# Patient Record
Sex: MALE | Race: WHITE | NOT HISPANIC OR LATINO | URBAN - METROPOLITAN AREA
[De-identification: names, ages, dates, MRNs, and addresses within clinical notes are randomized per-mention and may not be internally consistent; named-entity substitution may affect disease eponyms.]

---

## 2018-07-16 ENCOUNTER — EMERGENCY (EMERGENCY)
Facility: HOSPITAL | Age: 63
LOS: 1 days | Discharge: HOME | End: 2018-07-16
Attending: EMERGENCY MEDICINE
Payer: COMMERCIAL

## 2018-07-16 VITALS
HEART RATE: 64 BPM | DIASTOLIC BLOOD PRESSURE: 105 MMHG | RESPIRATION RATE: 18 BRPM | SYSTOLIC BLOOD PRESSURE: 162 MMHG | OXYGEN SATURATION: 97 % | TEMPERATURE: 98 F | WEIGHT: 240.08 LBS

## 2018-07-16 PROCEDURE — 99284 EMERGENCY DEPT VISIT MOD MDM: CPT | Mod: 25

## 2018-07-16 NOTE — ED PROVIDER NOTE - SKIN, MLM
pitting edema of bilateral lower legs with early cellulitis of the left leg, blisters noted likely pressure related symmetrical pitting edema of bilateral lower legs with early cellulitis of the left leg, blisters to left and venous stasis changes to the lower extremity

## 2018-07-16 NOTE — ED PROVIDER NOTE - ATTENDING CONTRIBUTION TO CARE
Patient with chief complaint of lower extremity swelling. Left leg has now started to have some open skin changes, warmth and redness. Patient states the swelling has been gradual. + mild pain to left medial shin and is concerned for cellulitis. no shortness of breath, no recent travel, no stationary periods, + smoker and educated to stop smoking.  GEN: NAD, awake, eyes open spontaneously  HEENT: NCAT, MMM, Trachea midline, normal conjunctiva, perrl  CHEST/LUNGS: Non-tachypneic, CTAB, bilateral breath sounds  CARDIAC: Non-tachycardic, normal perfusion  ABDOMEN: Soft, NTND, No rebound/guarding  MSK: bilateral lower extremity edema and left lower extremity with venous stasis changes, slight erythema, warmth and tenderness to palpation over skin overlying left medial shin  SKIN: No rashes, no petechiae, no vesicles  NEURO: CN grossly intact, normal coordination, no focal motor or sensory deficits  PSYCH: Alert, appropriate, cooperative, with capacity and insight   patient with skin changes consistent with cellulitis early on secondary to venous stasis, shortness of breath likely secondary to deconditioning and ekg within normal limits along with proBNP will have patient  follow up with primary medical doctor and cardiology  no evidence for dvt on history or clinical exam as swelling is symmetric with open wound to skin on left shin  will get iv, labs, proBNP, cbc, cmp, and ekg  will give antibiotics and discharge on antibiotics   No immediate life threatening issues present on history or clinical exam. Patient is a safe disposition home, has capacity and insight into their condition, is ambulatory in the Emergency Department with no further questions and will follow up with their doctor(s) this week. Patient  understands anticipatory guidance and was given strict return and follow up precautions. The patient has been informed of all concerning signs and symptoms to return to Emergency Department, the necessity to follow up with the PMD/Clinic/follow up provided within 2-3 days was explained, and the patient reports understanding of above with capacity and insight.

## 2018-07-16 NOTE — ED PROVIDER NOTE - PROGRESS NOTE DETAILS
pt labs all are normal, will start on empiric abx for cellulitis and emphasize outpatient follow up  Mi Palomo, PGY-2 EM

## 2018-07-16 NOTE — ED PROVIDER NOTE - MEDICAL DECISION MAKING DETAILS
Early cellulitis of the left lower extremity, bilateral pitting edema possibly 2/2 to underlying kidney/cardiac dysfunction, will check basic labs/bnp, oral abx, follow up care emphasized with cardiology or primary care   Mi Palomo, PGY-2 EM

## 2018-07-16 NOTE — ED PROVIDER NOTE - OBJECTIVE STATEMENT
64 yo M pmh cellulitis of the lower extremities 1 year ago p/w L>R lower extremity swelling, redness, and pain. Pt reports he first noticed the swelling this week. Feels similar to cellulitis he's had in the past. Reports sob walking short distances, also believes he has retained a lot of weight recently. Has not seen a pmd in many years. Denies any chest pain, fever/chills, n/v/d, or any other complaints. 64 yo M pmh cellulitis of the lower extremities 1 year ago p/w bilateral lower extremity swelling, and with redness, and pain to the left lower extremity. Pt reports he first noticed the left leg swelling this week. Feels similar to cellulitis he's had in the past. Reports sob walking short distances, also believes he has retained a lot of weight recently. Has not seen a pmd in many years. Denies any chest pain, fever/chills, n/v/d, or any other complaints.

## 2018-07-16 NOTE — ED PROVIDER NOTE - CARE PLAN
Principal Discharge DX:	Cellulitis  Assessment and plan of treatment:	1) Please follow-up with your primary care doctor in the next 1-2 days.  Please call tomorrow for an appointment.  If you cannot follow-up with your primary care doctor please return to the ED for any urgent issues.  2) You were given a copy of the tests performed today.  Please bring the results with you and review them with your primary care doctor.  3) If you have any worsening of symptoms or any other concerns please return to the ED immediately. This includes chest pain, shortness of breath, fever/chills, increased pain, or any other concerns.  4) Please continue taking your home medications as directed.    **New medications: Cefpodoxime, 200 mg twice a day for 7 days

## 2018-07-16 NOTE — ED PROVIDER NOTE - PLAN OF CARE
1) Please follow-up with your primary care doctor in the next 1-2 days.  Please call tomorrow for an appointment.  If you cannot follow-up with your primary care doctor please return to the ED for any urgent issues.  2) You were given a copy of the tests performed today.  Please bring the results with you and review them with your primary care doctor.  3) If you have any worsening of symptoms or any other concerns please return to the ED immediately. This includes chest pain, shortness of breath, fever/chills, increased pain, or any other concerns.  4) Please continue taking your home medications as directed.    **New medications: Cefpodoxime, 200 mg twice a day for 7 days

## 2018-07-17 VITALS
RESPIRATION RATE: 16 BRPM | HEART RATE: 80 BPM | SYSTOLIC BLOOD PRESSURE: 170 MMHG | OXYGEN SATURATION: 99 % | DIASTOLIC BLOOD PRESSURE: 100 MMHG

## 2018-07-17 LAB
ALBUMIN SERPL ELPH-MCNC: 4 G/DL — SIGNIFICANT CHANGE UP (ref 3.3–5)
ALP SERPL-CCNC: 104 U/L — SIGNIFICANT CHANGE UP (ref 40–120)
ALT FLD-CCNC: 37 U/L — SIGNIFICANT CHANGE UP (ref 10–45)
ANION GAP SERPL CALC-SCNC: 12 MMOL/L — SIGNIFICANT CHANGE UP (ref 5–17)
AST SERPL-CCNC: 29 U/L — SIGNIFICANT CHANGE UP (ref 10–40)
BASOPHILS # BLD AUTO: 0 K/UL — SIGNIFICANT CHANGE UP (ref 0–0.2)
BASOPHILS NFR BLD AUTO: 0.3 % — SIGNIFICANT CHANGE UP (ref 0–2)
BILIRUB SERPL-MCNC: 0.5 MG/DL — SIGNIFICANT CHANGE UP (ref 0.2–1.2)
BUN SERPL-MCNC: 21 MG/DL — SIGNIFICANT CHANGE UP (ref 7–23)
CALCIUM SERPL-MCNC: 9.2 MG/DL — SIGNIFICANT CHANGE UP (ref 8.4–10.5)
CHLORIDE SERPL-SCNC: 104 MMOL/L — SIGNIFICANT CHANGE UP (ref 96–108)
CO2 SERPL-SCNC: 26 MMOL/L — SIGNIFICANT CHANGE UP (ref 22–31)
CREAT SERPL-MCNC: 0.87 MG/DL — SIGNIFICANT CHANGE UP (ref 0.5–1.3)
EOSINOPHIL # BLD AUTO: 0.2 K/UL — SIGNIFICANT CHANGE UP (ref 0–0.5)
EOSINOPHIL NFR BLD AUTO: 3.1 % — SIGNIFICANT CHANGE UP (ref 0–6)
GAS PNL BLDV: SIGNIFICANT CHANGE UP
GLUCOSE SERPL-MCNC: 105 MG/DL — HIGH (ref 70–99)
HCT VFR BLD CALC: 41.7 % — SIGNIFICANT CHANGE UP (ref 39–50)
HGB BLD-MCNC: 14.3 G/DL — SIGNIFICANT CHANGE UP (ref 13–17)
LYMPHOCYTES # BLD AUTO: 2.6 K/UL — SIGNIFICANT CHANGE UP (ref 1–3.3)
LYMPHOCYTES # BLD AUTO: 35 % — SIGNIFICANT CHANGE UP (ref 13–44)
MCHC RBC-ENTMCNC: 30.9 PG — SIGNIFICANT CHANGE UP (ref 27–34)
MCHC RBC-ENTMCNC: 34.4 GM/DL — SIGNIFICANT CHANGE UP (ref 32–36)
MCV RBC AUTO: 89.9 FL — SIGNIFICANT CHANGE UP (ref 80–100)
MONOCYTES # BLD AUTO: 0.8 K/UL — SIGNIFICANT CHANGE UP (ref 0–0.9)
MONOCYTES NFR BLD AUTO: 11.5 % — SIGNIFICANT CHANGE UP (ref 2–14)
NEUTROPHILS # BLD AUTO: 3.7 K/UL — SIGNIFICANT CHANGE UP (ref 1.8–7.4)
NEUTROPHILS NFR BLD AUTO: 50 % — SIGNIFICANT CHANGE UP (ref 43–77)
NT-PROBNP SERPL-SCNC: 23 PG/ML — SIGNIFICANT CHANGE UP (ref 0–300)
PLATELET # BLD AUTO: 175 K/UL — SIGNIFICANT CHANGE UP (ref 150–400)
POTASSIUM SERPL-MCNC: 4.1 MMOL/L — SIGNIFICANT CHANGE UP (ref 3.5–5.3)
POTASSIUM SERPL-SCNC: 4.1 MMOL/L — SIGNIFICANT CHANGE UP (ref 3.5–5.3)
PROT SERPL-MCNC: 7.2 G/DL — SIGNIFICANT CHANGE UP (ref 6–8.3)
RBC # BLD: 4.64 M/UL — SIGNIFICANT CHANGE UP (ref 4.2–5.8)
RBC # FLD: 12.5 % — SIGNIFICANT CHANGE UP (ref 10.3–14.5)
SODIUM SERPL-SCNC: 142 MMOL/L — SIGNIFICANT CHANGE UP (ref 135–145)
WBC # BLD: 7.4 K/UL — SIGNIFICANT CHANGE UP (ref 3.8–10.5)
WBC # FLD AUTO: 7.4 K/UL — SIGNIFICANT CHANGE UP (ref 3.8–10.5)

## 2018-07-17 PROCEDURE — 85027 COMPLETE CBC AUTOMATED: CPT

## 2018-07-17 PROCEDURE — 82803 BLOOD GASES ANY COMBINATION: CPT

## 2018-07-17 PROCEDURE — 84295 ASSAY OF SERUM SODIUM: CPT

## 2018-07-17 PROCEDURE — 85014 HEMATOCRIT: CPT

## 2018-07-17 PROCEDURE — 99283 EMERGENCY DEPT VISIT LOW MDM: CPT

## 2018-07-17 PROCEDURE — 82947 ASSAY GLUCOSE BLOOD QUANT: CPT

## 2018-07-17 PROCEDURE — 82330 ASSAY OF CALCIUM: CPT

## 2018-07-17 PROCEDURE — 83605 ASSAY OF LACTIC ACID: CPT

## 2018-07-17 PROCEDURE — 82435 ASSAY OF BLOOD CHLORIDE: CPT

## 2018-07-17 PROCEDURE — 80053 COMPREHEN METABOLIC PANEL: CPT

## 2018-07-17 PROCEDURE — 84132 ASSAY OF SERUM POTASSIUM: CPT

## 2018-07-17 PROCEDURE — 83880 ASSAY OF NATRIURETIC PEPTIDE: CPT

## 2018-07-17 RX ORDER — CEFPODOXIME PROXETIL 100 MG
200 TABLET ORAL EVERY 12 HOURS
Qty: 0 | Refills: 0 | Status: DISCONTINUED | OUTPATIENT
Start: 2018-07-17 | End: 2018-07-20

## 2018-07-17 RX ORDER — CEFPODOXIME PROXETIL 100 MG
400 TABLET ORAL EVERY 12 HOURS
Qty: 0 | Refills: 0 | Status: DISCONTINUED | OUTPATIENT
Start: 2018-07-17 | End: 2018-07-17

## 2018-07-17 RX ORDER — CEFPODOXIME PROXETIL 100 MG
1 TABLET ORAL
Qty: 14 | Refills: 0 | OUTPATIENT
Start: 2018-07-17 | End: 2018-07-23

## 2018-07-17 RX ADMIN — Medication 200 MILLIGRAM(S): at 01:00

## 2018-07-17 NOTE — ED ADULT NURSE NOTE - OBJECTIVE STATEMENT
pt co lle redness pain swelling worsening with blisters x1wk, aox3 maex4 co schafefr while walking denies cp

## 2018-08-06 ENCOUNTER — INPATIENT (INPATIENT)
Facility: HOSPITAL | Age: 63
LOS: 0 days | Discharge: HOME | DRG: 301 | End: 2018-08-07
Attending: HOSPITALIST | Admitting: INTERNAL MEDICINE
Payer: COMMERCIAL

## 2018-08-06 VITALS
HEART RATE: 73 BPM | OXYGEN SATURATION: 96 % | DIASTOLIC BLOOD PRESSURE: 97 MMHG | WEIGHT: 229.94 LBS | SYSTOLIC BLOOD PRESSURE: 172 MMHG | RESPIRATION RATE: 18 BRPM | TEMPERATURE: 98 F

## 2018-08-06 PROBLEM — Z00.00 ENCOUNTER FOR PREVENTIVE HEALTH EXAMINATION: Status: ACTIVE | Noted: 2018-08-06

## 2018-08-06 LAB
ALBUMIN SERPL ELPH-MCNC: 4.2 G/DL — SIGNIFICANT CHANGE UP (ref 3.3–5)
ALP SERPL-CCNC: 109 U/L — SIGNIFICANT CHANGE UP (ref 40–120)
ALT FLD-CCNC: 36 U/L — SIGNIFICANT CHANGE UP (ref 10–45)
ANION GAP SERPL CALC-SCNC: 11 MMOL/L — SIGNIFICANT CHANGE UP (ref 5–17)
APPEARANCE UR: CLEAR — SIGNIFICANT CHANGE UP
APTT BLD: 30.2 SEC — SIGNIFICANT CHANGE UP (ref 27.5–37.4)
AST SERPL-CCNC: 33 U/L — SIGNIFICANT CHANGE UP (ref 10–40)
BACTERIA # UR AUTO: ABNORMAL /HPF
BASOPHILS # BLD AUTO: 0 K/UL — SIGNIFICANT CHANGE UP (ref 0–0.2)
BASOPHILS NFR BLD AUTO: 0.5 % — SIGNIFICANT CHANGE UP (ref 0–2)
BILIRUB SERPL-MCNC: 0.4 MG/DL — SIGNIFICANT CHANGE UP (ref 0.2–1.2)
BILIRUB UR-MCNC: NEGATIVE — SIGNIFICANT CHANGE UP
BUN SERPL-MCNC: 16 MG/DL — SIGNIFICANT CHANGE UP (ref 7–23)
CALCIUM SERPL-MCNC: 9.3 MG/DL — SIGNIFICANT CHANGE UP (ref 8.4–10.5)
CHLORIDE SERPL-SCNC: 104 MMOL/L — SIGNIFICANT CHANGE UP (ref 96–108)
CO2 SERPL-SCNC: 24 MMOL/L — SIGNIFICANT CHANGE UP (ref 22–31)
COLOR SPEC: YELLOW — SIGNIFICANT CHANGE UP
COMMENT - URINE: SIGNIFICANT CHANGE UP
CREAT SERPL-MCNC: 0.85 MG/DL — SIGNIFICANT CHANGE UP (ref 0.5–1.3)
DIFF PNL FLD: NEGATIVE — SIGNIFICANT CHANGE UP
EOSINOPHIL # BLD AUTO: 0.2 K/UL — SIGNIFICANT CHANGE UP (ref 0–0.5)
EOSINOPHIL NFR BLD AUTO: 3.2 % — SIGNIFICANT CHANGE UP (ref 0–6)
GAS PNL BLDV: SIGNIFICANT CHANGE UP
GLUCOSE SERPL-MCNC: 108 MG/DL — HIGH (ref 70–99)
GLUCOSE UR QL: NEGATIVE — SIGNIFICANT CHANGE UP
HCT VFR BLD CALC: 39.5 % — SIGNIFICANT CHANGE UP (ref 39–50)
HGB BLD-MCNC: 13.4 G/DL — SIGNIFICANT CHANGE UP (ref 13–17)
INR BLD: 1.03 RATIO — SIGNIFICANT CHANGE UP (ref 0.88–1.16)
KETONES UR-MCNC: NEGATIVE — SIGNIFICANT CHANGE UP
LEUKOCYTE ESTERASE UR-ACNC: NEGATIVE — SIGNIFICANT CHANGE UP
LYMPHOCYTES # BLD AUTO: 2.5 K/UL — SIGNIFICANT CHANGE UP (ref 1–3.3)
LYMPHOCYTES # BLD AUTO: 36.5 % — SIGNIFICANT CHANGE UP (ref 13–44)
MCHC RBC-ENTMCNC: 29.9 PG — SIGNIFICANT CHANGE UP (ref 27–34)
MCHC RBC-ENTMCNC: 34 GM/DL — SIGNIFICANT CHANGE UP (ref 32–36)
MCV RBC AUTO: 87.8 FL — SIGNIFICANT CHANGE UP (ref 80–100)
MONOCYTES # BLD AUTO: 0.7 K/UL — SIGNIFICANT CHANGE UP (ref 0–0.9)
MONOCYTES NFR BLD AUTO: 10 % — SIGNIFICANT CHANGE UP (ref 2–14)
NEUTROPHILS # BLD AUTO: 3.4 K/UL — SIGNIFICANT CHANGE UP (ref 1.8–7.4)
NEUTROPHILS NFR BLD AUTO: 49.7 % — SIGNIFICANT CHANGE UP (ref 43–77)
NITRITE UR-MCNC: NEGATIVE — SIGNIFICANT CHANGE UP
NT-PROBNP SERPL-SCNC: 13 PG/ML — SIGNIFICANT CHANGE UP (ref 0–300)
PH UR: 6 — SIGNIFICANT CHANGE UP (ref 5–8)
PLATELET # BLD AUTO: 212 K/UL — SIGNIFICANT CHANGE UP (ref 150–400)
POTASSIUM SERPL-MCNC: 4.2 MMOL/L — SIGNIFICANT CHANGE UP (ref 3.5–5.3)
POTASSIUM SERPL-SCNC: 4.2 MMOL/L — SIGNIFICANT CHANGE UP (ref 3.5–5.3)
PROT SERPL-MCNC: 7.5 G/DL — SIGNIFICANT CHANGE UP (ref 6–8.3)
PROT UR-MCNC: NEGATIVE — SIGNIFICANT CHANGE UP
PROTHROM AB SERPL-ACNC: 11.2 SEC — SIGNIFICANT CHANGE UP (ref 9.8–12.7)
RBC # BLD: 4.5 M/UL — SIGNIFICANT CHANGE UP (ref 4.2–5.8)
RBC # FLD: 12.4 % — SIGNIFICANT CHANGE UP (ref 10.3–14.5)
RBC CASTS # UR COMP ASSIST: SIGNIFICANT CHANGE UP /HPF (ref 0–2)
SODIUM SERPL-SCNC: 139 MMOL/L — SIGNIFICANT CHANGE UP (ref 135–145)
SP GR SPEC: 1.03 — HIGH (ref 1.01–1.02)
TROPONIN T, HIGH SENSITIVITY RESULT: 10 NG/L — SIGNIFICANT CHANGE UP (ref 0–51)
UROBILINOGEN FLD QL: 1 MG/DL
WBC # BLD: 6.8 K/UL — SIGNIFICANT CHANGE UP (ref 3.8–10.5)
WBC # FLD AUTO: 6.8 K/UL — SIGNIFICANT CHANGE UP (ref 3.8–10.5)
WBC UR QL: SIGNIFICANT CHANGE UP /HPF (ref 0–5)

## 2018-08-06 PROCEDURE — 99285 EMERGENCY DEPT VISIT HI MDM: CPT | Mod: 25

## 2018-08-06 PROCEDURE — 93010 ELECTROCARDIOGRAM REPORT: CPT

## 2018-08-06 PROCEDURE — 71046 X-RAY EXAM CHEST 2 VIEWS: CPT | Mod: 26

## 2018-08-06 NOTE — ED PROVIDER NOTE - MEDICAL DECISION MAKING DETAILS
63 M no known pmhx no PMD, hasn't seen a doctor in very long time, p/w worsening bilateral lower extremity edema and erythema with new weeping of LLE secondary to increasing edema, concern for poorly treated cellulitis as he took cefpodoxime for 7 days and no improvement; notable RIZO and orthopnea. PE remarkable for aforementioned. M/p fluid overload and mild chf given story, will obtain labs pro-bnp and cardiac enzymes, ekg, cxr and reassess TBA.

## 2018-08-06 NOTE — ED PROVIDER NOTE - NS ED ROS FT
Constitutional: No fever or chills  Eyes: No visual changes, eye pain or redness  HEENT: No throat pain, ear pain, nasal pain. No nose bleeding.  CV: No chest pain +LE edema  Resp: +shortness of breath, worsening. no cough  GI: No abd pain. No nausea or vomiting. No diarrhea. No constipation.   : No dysuria, hematuria.   MSK: No musculoskeletal pain  Skin: No rash  Neuro: No headache. No numbness or tingling. No weakness.

## 2018-08-06 NOTE — ED PROVIDER NOTE - ATTENDING CONTRIBUTION TO CARE
attending Ligia: 63yM no PMH does not follow with a doctor presents with worsening b/l LE edema and redness x weeks. Seen in ED several weeks ago and completed course abx without improvement. Also with orthopnea, RIZO and abdominal bloating. Denies any chest pain. Denies fevers or chills.  Concern for edema 2/2 CHF vs DVT vs chronic venous stasis. Will obtain ekg, place on tele, labs including pro-BNP, LE duplex eval for DVT, cxr and reassess

## 2018-08-06 NOTE — ED PROVIDER NOTE - PHYSICAL EXAMINATION
GEN: Well Appearing, Nontoxic, NAD  HEENT: NC/AT, Symm Facies. PERRL, EOMI, MMM, posterior pharynx clear  CV: No JVD/Bruits or stridor;  +S1S2, RRR w/o m/g/r  RESP: CTAB w/o w/r/r  ABD: Soft, nt/nd, +BS. No guarding/rebound. No RUQ tender, no CVAT  EXT/MSK: 2+ pitting edema with weeping noted to lateral L lower leg. +erythema to bilateral lower extremities below knee without warmth or tenderness. No posterior calf tenderness. WWP. Palpable pulses. FROMx4  SKIN: No erythema, lesions or rash  Neuro: Grossly intact, AOX3 with normal speech, CN II-XII intact; Sensation intact, motor 5/5 throughout. Gait normal

## 2018-08-07 ENCOUNTER — TRANSCRIPTION ENCOUNTER (OUTPATIENT)
Age: 63
End: 2018-08-07

## 2018-08-07 VITALS
OXYGEN SATURATION: 98 % | SYSTOLIC BLOOD PRESSURE: 154 MMHG | HEART RATE: 62 BPM | DIASTOLIC BLOOD PRESSURE: 96 MMHG | RESPIRATION RATE: 19 BRPM | TEMPERATURE: 98 F

## 2018-08-07 DIAGNOSIS — R60.9 EDEMA, UNSPECIFIED: ICD-10-CM

## 2018-08-07 DIAGNOSIS — Z29.9 ENCOUNTER FOR PROPHYLACTIC MEASURES, UNSPECIFIED: ICD-10-CM

## 2018-08-07 DIAGNOSIS — R60.0 LOCALIZED EDEMA: ICD-10-CM

## 2018-08-07 DIAGNOSIS — I10 ESSENTIAL (PRIMARY) HYPERTENSION: ICD-10-CM

## 2018-08-07 LAB
HBA1C BLD-MCNC: 6.1 % — HIGH (ref 4–5.6)
TROPONIN T, HIGH SENSITIVITY RESULT: 10 NG/L — SIGNIFICANT CHANGE UP (ref 0–51)
TSH SERPL-MCNC: 3.05 UIU/ML — SIGNIFICANT CHANGE UP (ref 0.27–4.2)

## 2018-08-07 PROCEDURE — 93970 EXTREMITY STUDY: CPT | Mod: 26

## 2018-08-07 PROCEDURE — 84295 ASSAY OF SERUM SODIUM: CPT

## 2018-08-07 PROCEDURE — 12345: CPT

## 2018-08-07 PROCEDURE — 83880 ASSAY OF NATRIURETIC PEPTIDE: CPT

## 2018-08-07 PROCEDURE — 81001 URINALYSIS AUTO W/SCOPE: CPT

## 2018-08-07 PROCEDURE — 96374 THER/PROPH/DIAG INJ IV PUSH: CPT

## 2018-08-07 PROCEDURE — 83036 HEMOGLOBIN GLYCOSYLATED A1C: CPT

## 2018-08-07 PROCEDURE — 80053 COMPREHEN METABOLIC PANEL: CPT

## 2018-08-07 PROCEDURE — 82803 BLOOD GASES ANY COMBINATION: CPT

## 2018-08-07 PROCEDURE — 85610 PROTHROMBIN TIME: CPT

## 2018-08-07 PROCEDURE — 99223 1ST HOSP IP/OBS HIGH 75: CPT | Mod: GC

## 2018-08-07 PROCEDURE — 82330 ASSAY OF CALCIUM: CPT

## 2018-08-07 PROCEDURE — 93970 EXTREMITY STUDY: CPT

## 2018-08-07 PROCEDURE — G0378: CPT

## 2018-08-07 PROCEDURE — 84443 ASSAY THYROID STIM HORMONE: CPT

## 2018-08-07 PROCEDURE — 93005 ELECTROCARDIOGRAM TRACING: CPT

## 2018-08-07 PROCEDURE — 99285 EMERGENCY DEPT VISIT HI MDM: CPT | Mod: 25

## 2018-08-07 PROCEDURE — 82435 ASSAY OF BLOOD CHLORIDE: CPT

## 2018-08-07 PROCEDURE — 85027 COMPLETE CBC AUTOMATED: CPT

## 2018-08-07 PROCEDURE — 85730 THROMBOPLASTIN TIME PARTIAL: CPT

## 2018-08-07 PROCEDURE — 83605 ASSAY OF LACTIC ACID: CPT

## 2018-08-07 PROCEDURE — 85014 HEMATOCRIT: CPT

## 2018-08-07 PROCEDURE — 71046 X-RAY EXAM CHEST 2 VIEWS: CPT

## 2018-08-07 PROCEDURE — 82947 ASSAY GLUCOSE BLOOD QUANT: CPT

## 2018-08-07 PROCEDURE — 84132 ASSAY OF SERUM POTASSIUM: CPT

## 2018-08-07 PROCEDURE — 84484 ASSAY OF TROPONIN QUANT: CPT

## 2018-08-07 RX ORDER — HYDROCHLOROTHIAZIDE 25 MG
12.5 TABLET ORAL DAILY
Qty: 0 | Refills: 0 | Status: DISCONTINUED | OUTPATIENT
Start: 2018-08-07 | End: 2018-08-07

## 2018-08-07 RX ORDER — ASPIRIN/CALCIUM CARB/MAGNESIUM 324 MG
81 TABLET ORAL DAILY
Qty: 0 | Refills: 0 | Status: DISCONTINUED | OUTPATIENT
Start: 2018-08-07 | End: 2018-08-07

## 2018-08-07 RX ORDER — ENOXAPARIN SODIUM 100 MG/ML
40 INJECTION SUBCUTANEOUS DAILY
Qty: 0 | Refills: 0 | Status: DISCONTINUED | OUTPATIENT
Start: 2018-08-07 | End: 2018-08-07

## 2018-08-07 RX ORDER — ASPIRIN/CALCIUM CARB/MAGNESIUM 324 MG
1 TABLET ORAL
Qty: 0 | Refills: 0 | COMMUNITY

## 2018-08-07 RX ORDER — ACETAMINOPHEN 500 MG
650 TABLET ORAL EVERY 6 HOURS
Qty: 0 | Refills: 0 | Status: DISCONTINUED | OUTPATIENT
Start: 2018-08-07 | End: 2018-08-07

## 2018-08-07 RX ORDER — FUROSEMIDE 40 MG
40 TABLET ORAL ONCE
Qty: 0 | Refills: 0 | Status: COMPLETED | OUTPATIENT
Start: 2018-08-07 | End: 2018-08-07

## 2018-08-07 RX ORDER — HYDROCHLOROTHIAZIDE 25 MG
25 TABLET ORAL DAILY
Qty: 0 | Refills: 0 | Status: DISCONTINUED | OUTPATIENT
Start: 2018-08-07 | End: 2018-08-07

## 2018-08-07 RX ADMIN — Medication 12.5 MILLIGRAM(S): at 06:00

## 2018-08-07 RX ADMIN — Medication 650 MILLIGRAM(S): at 04:34

## 2018-08-07 RX ADMIN — Medication 40 MILLIGRAM(S): at 00:48

## 2018-08-07 RX ADMIN — Medication 650 MILLIGRAM(S): at 03:55

## 2018-08-07 RX ADMIN — ENOXAPARIN SODIUM 40 MILLIGRAM(S): 100 INJECTION SUBCUTANEOUS at 12:57

## 2018-08-07 RX ADMIN — Medication 81 MILLIGRAM(S): at 06:00

## 2018-08-07 NOTE — DISCHARGE NOTE ADULT - MEDICATION SUMMARY - MEDICATIONS TO TAKE
I will START or STAY ON the medications listed below when I get home from the hospital:    Aspir 81 oral delayed release tablet  -- 1 tab(s) by mouth once a day  -- Indication: For cad    hydroCHLOROthiazide 25 mg oral tablet  -- 1 tab(s) by mouth once a day  -- Indication: For blood pressure

## 2018-08-07 NOTE — ED ADULT NURSE NOTE - OBJECTIVE STATEMENT
64y/o male walked into ED a&ox3 c/o swelling to legs. patient reports he was here recently for cellulitis of left lower extremity, discharged home with antibiotics. Finished antibiotics and still has not had any relief. Has an appointment to see PCP at [facility] clinic in 2 days but states swelling to legs has been getting much worse. States left lower extremity is more swollen than the right and it has been "weeping". Also c/o SOB with exertion and while lying flat. Denies CP, fever, cough, N/V/D, urinary symptoms, current SOB. Lungs diminished b/l. Abd firm, distended, nontender. MD at bedside.

## 2018-08-07 NOTE — PROGRESS NOTE ADULT - SUBJECTIVE AND OBJECTIVE BOX
Patient is a 63y old  Male who presents with a chief complaint of LE edema (07 Aug 2018 03:10)  Subjective: No acute events overnight. Pt feels 'better' today- states leg swelling is improved. No sob/cough, no aches/pains, no fevers chills.     VITAL SIGNS:  Vital Signs Last 24 Hrs  T(C): 36.7 (07 Aug 2018 13:15), Max: 36.8 (07 Aug 2018 05:50)  T(F): 98.1 (07 Aug 2018 13:15), Max: 98.2 (07 Aug 2018 05:50)  HR: 70 (07 Aug 2018 13:15) (59 - 73)  BP: 145/97 (07 Aug 2018 13:15) (145/88 - 172/97)  BP(mean): --  RR: 18 (07 Aug 2018 13:15) (17 - 18)  SpO2: 99% (07 Aug 2018 13:15) (95% - 100%)      PHYSICAL EXAM:     GENERAL: no acute distress  HEENT: PERRLA, EOMI, moist oropharynx   RESPIRATORY: CTAB, no w/c   CARDIOVASCULAR: RRR, no murmurs, gallops, rubs  ABDOMINAL: soft, non-tender, non-distended, positive bowel sounds   EXTREMITIES: no clubbing, cyanosis, or edema  NEUROLOGICAL: alert and oriented x 3, non-focal  SKIN: no rashes or lesions   MUSCULOSKELETAL: no gross joint deformity                          13.4   6.8   )-----------( 212      ( 06 Aug 2018 23:10 )             39.5     08-06    139  |  104  |  16  ----------------------------<  108<H>  4.2   |  24  |  0.85    Ca    9.3      06 Aug 2018 23:10    TPro  7.5  /  Alb  4.2  /  TBili  0.4  /  DBili  x   /  AST  33  /  ALT  36  /  AlkPhos  109  08-06      CAPILLARY BLOOD GLUCOSE          MEDICATIONS  (STANDING):  aspirin  chewable 81 milliGRAM(s) Oral daily  enoxaparin Injectable 40 milliGRAM(s) SubCutaneous daily  hydrochlorothiazide 12.5 milliGRAM(s) Oral daily    MEDICATIONS  (PRN):  acetaminophen   Tablet 650 milliGRAM(s) Oral every 6 hours PRN For Temp greater than 38 C (100.4 F)  acetaminophen   Tablet. 650 milliGRAM(s) Oral every 6 hours PRN Mild Pain (1 - 3)

## 2018-08-07 NOTE — PROGRESS NOTE ADULT - PROBLEM SELECTOR PLAN 2
-Pt has uncontrolled BP, which may explain chronic venous insufficiency changes on exam  -Increase HCTZ to 25mg po daily
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
## 2018-08-07 NOTE — DISCHARGE NOTE ADULT - PLAN OF CARE
you will be free of symptoms Your blood pressure will be controlled. Continue with your blood pressure medications; eat a heart healthy diet with low salt diet; exercise regularly (consult with your physician or cardiologist first); maintain a heart healthy weight; if you smoke - quit (A resource to help you stop smoking is the [facility] – phone number [phone].); include healthy ways to manage stress. Continue to follow with your primary care physician or cardiologist. lower extremity swelling improving with added medication HCTZ. monitor swelling in your legs. follow up with PCP if swelling worsens Your swelling is likely due to poor venous circulation- lower extremity swelling improved with added medication HCTZ which will help swelling as well as lower your blood pressure. Monitor swelling in your legs. Be sure to follow up with your primary care doctor at your upcoming appointment.

## 2018-08-07 NOTE — ED ADULT NURSE NOTE - NSIMPLEMENTINTERV_GEN_ALL_ED
Implemented All Universal Safety Interventions:  [place] to call system. Call bell, personal items and telephone within reach. Instruct patient to call for assistance. Room bathroom lighting operational. Non-slip footwear when patient is off stretcher. Physically safe environment: no spills, clutter or unnecessary equipment. Stretcher in lowest position, wheels locked, appropriate side rails in place.

## 2018-08-07 NOTE — DISCHARGE NOTE ADULT - PATIENT PORTAL LINK FT
You can access the [facility] Patient Portal, offered by [facility], by registering with the following website: [email or web address]

## 2018-08-07 NOTE — DISCHARGE NOTE ADULT - CONDITION (STATED IN TERMS THAT PERMIT A SPECIFIC MEASURABLE COMPARISON WITH CONDITION ON ADMISSION):
at time of discharge patient denies chest pain, palpitations or SOB at rest or upon ambulation. LE edema improving

## 2018-08-07 NOTE — H&P ADULT - NSHPLABSRESULTS_GEN_ALL_CORE
139  |  104  |  16  ----------------------------<  108<H>  4.2   |  24  |  0.85    Ca    9.3      06 Aug 2018 23:10    TPro  7.5  /  Alb  4.2  /  TBili  0.4  /  DBili  x   /  AST  33  /  ALT  36  /  AlkPhos  109        PT/INR - ( 06 Aug 2018 23:10 )   PT: 11.2 sec;   INR: 1.03 ratio         PTT - ( 06 Aug 2018 23:10 )  PTT:30.2 sec              Urinalysis Basic - ( 06 Aug 2018 23:20 )    Color: Yellow / Appearance: Clear / S.027 / pH: x  Gluc: x / Ketone: Negative  / Bili: Negative / Urobili: 1 mg/dL   Blood: x / Protein: Negative / Nitrite: Negative   Leuk Esterase: Negative / RBC: 0-2 /HPF / WBC 0-2 /HPF   Sq Epi: x / Non Sq Epi: x / Bacteria: Few /HPF             13.4   6.8   )-----------( 212      ( 06 Aug 2018 23:10 )             39.5     Troponin T, High Sensitivity Result: 10:  See http://labs/test/[email or web address] on the [facility] intranet for more  information ng/L (18 @ 00:56)    Urinalysis + Microscopic Examination (18 @ 23:20)    Urine Appearance: Clear    Urobilinogen: 1 mg/dL    Specific Gravity: 1.027    Protein, Urine: Negative    pH Urine: 6.0    Leukocyte Esterase Concentration: Negative    Nitrite: Negative    Ketone - Urine: Negative    Bilirubin: Negative    Color: Yellow    Glucose Qualitative, Urine: Negative    Blood, Urine: Negative    Red Blood Cell - Urine: 0-2 /HPF    White Blood Cell - Urine: 0-2 /HPF    Bacteria: Few /HPF    Comment - Urine: Few Mucus Strands     139  |  104  |  16  ----------------------------<  108<H>  4.2   |  24  |  0.85    Ca    9.3      06 Aug 2018 23:10    TPro  7.5  /  Alb  4.2  /  TBili  0.4  /  DBili  x   /  AST  33  /  ALT  36  /  AlkPhos  109      PT/INR - ( 06 Aug 2018 23:10 )   PT: 11.2 sec;   INR: 1.03 ratio    PTT - ( 06 Aug 2018 23:10 )  PTT:30.2 sec              Urinalysis Basic - ( 06 Aug 2018 23:20 )    Color: Yellow / Appearance: Clear / S.027 / pH: x  Gluc: x / Ketone: Negative  / Bili: Negative / Urobili: 1 mg/dL   Blood: x / Protein: Negative / Nitrite: Negative   Leuk Esterase: Negative / RBC: 0-2 /HPF / WBC 0-2 /HPF   Sq Epi: x / Non Sq Epi: x / Bacteria: Few /HPF             13.4   6.8   )-----------( 212      ( 06 Aug 2018 23:10 )             39.5     Troponin T, High Sensitivity Result: 10:  See http://labs/test/[email or web address] on the [facility] intranet for more  information ng/L (18 @ 00:56)    Urinalysis + Microscopic Examination (18 @ 23:20)    Urine Appearance: Clear    Urobilinogen: 1 mg/dL    Specific Gravity: 1.027    Protein, Urine: Negative    pH Urine: 6.0    Leukocyte Esterase Concentration: Negative    Nitrite: Negative    Ketone - Urine: Negative    Bilirubin: Negative    Color: Yellow    Glucose Qualitative, Urine: Negative    Blood, Urine: Negative    Red Blood Cell - Urine: 0-2 /HPF    White Blood Cell - Urine: 0-2 /HPF    Bacteria: Few /HPF    Comment - Urine: Few Mucus Strands

## 2018-08-07 NOTE — DISCHARGE NOTE ADULT - CARE PROVIDER_API CALL
Nikia Vizcaino), Medicine  Gen [address]  [address] [place] [id number]  Phone: [phone]  Fax: [phone]

## 2018-08-07 NOTE — ED ADULT NURSE REASSESSMENT NOTE - NS ED NURSE REASSESS COMMENT FT1
Patient received bed assignment. Patient aware of assignment. Report given to receiving RN. VSS. Patient stable for transport. Chart given to charge desk. Safety and comfort maintained while in ED. Patient received bed assignment. Patient aware of assignment. Report given to receiving RN. VSS. Patient stable for transport. As per MD Aguilar, patient okay to go to unit without CM. Chart given to charge desk. Safety and comfort maintained while in ED.

## 2018-08-07 NOTE — H&P ADULT - NSHPSOCIALHISTORY_GEN_ALL_CORE
Social History:    Marital Status:  (   )    (   ) Single    (   )    (  )   Occupation:   Lives with: (  ) alone  (  ) children   (  ) spouse   (  ) parents  (  ) other    Substance Use (street drugs): (  ) never used  (  ) other:  Tobacco Usage:  (   ) never smoked   (   ) former smoker   (   ) current smoker  (     ) pack year  (        ) last cigarette date  Alcohol Usage:  Sexual History: Social History:    Marital Status:  (   )    (  X ) Single    (   )    (  )   Occupation:   Lives with: ( X ) alone  (  ) children   (  ) spouse   (  ) parents  (  ) other    Substance Use (street drugs): ( X ) never used  (  ) other:  Tobacco Usage:  (   X) never smoked   (   ) former smoker   (   ) current smoker  (     ) pack year  (        ) last cigarette date  Alcohol Usage: Denies
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
## 2018-08-07 NOTE — H&P ADULT - PROBLEM SELECTOR PLAN 1
-Pt with worsening LE edema, positive Homans signs concerning for DVT. Pt also with RIZO concerning for RHF, but less likely as BNP/liver enzymes are normal. Low suspicion for Cellulitis as there is b/l erythema, with no warmth, afebrile, and no leukocytosis.  -s/p Lasix in the ED, will hold off on further Lasix  -Doppler b/l to rule out DVT  -Elevate legs  -BNP 13, neg troponin, no lactate -Pt with worsening LE edema, positive Homans signs concerning for DVT. Pt also with RIZO concerning for RHF, but less likely as BNP/liver enzymes are normal. Low suspicion for Cellulitis as there is b/l erythema, with no warmth, afebrile, and no leukocytosis.  -s/p Lasix in the ED, will hold off on further Lasix  -Doppler b/l to rule out DVT  -Elevate legs  -BNP 13, neg troponin, no lactate  -CXR with clear lungs, no signs of pulmonary edema or pulmonary effusion -Pt with worsening LE edema, positive Homans signs concerning for DVT. Pt also with RIZO concerning for RHF, but less likely as BNP/liver enzymes are normal. Low suspicion for Cellulitis as there is b/l erythema, with no warmth, afebrile, and no leukocytosis.  -Wells criteria: 2= Moderate risk  -s/p Lasix in the ED, will hold off on further Lasix  -Doppler b/l to rule out DVT  -Elevate legs  -BNP 13, neg troponin, no lactate  -CXR with clear lungs, no signs of pulmonary edema or pulmonary effusion -Pt with worsening LE edema, positive Homans signs concerning for DVT. Pt also with RIZO concerning for RHF, but less likely as BNP/liver enzymes are normal. Low suspicion for Cellulitis as there is b/l erythema, with no warmth, afebrile, and no leukocytosis. Also suspect this may be due to venous insufficiency or lymphedema.  -Wells criteria: 2= Moderate risk  -s/p Lasix in the ED, will hold off on further Lasix  -Doppler b/l to rule out DVT  -Elevate legs  -BNP 13, neg troponin, no lactate  -CXR with clear lungs, no signs of pulmonary edema or pulmonary effusion

## 2018-08-07 NOTE — H&P ADULT - PROBLEM SELECTOR PLAN 3
DVT: Lovenox  Diet: DASH  Dispo: Ensure PCP f/u as pt is due for several health maintenance examinations, will c/w ASA 81mg

## 2018-08-07 NOTE — H&P ADULT - NSHPREVIEWOFSYSTEMS_GEN_ALL_CORE
REVIEW OF SYSTEMS:    CONSTITUTIONAL: No weakness, fevers or chills  EYES/ENT: No visual changes;  No vertigo or throat pain   NECK: No pain or stiffness  RESPIRATORY: No cough, wheezing, hemoptysis; No shortness of breath  CARDIOVASCULAR: No chest pain or palpitations  GASTROINTESTINAL: No abdominal or epigastric pain. No nausea, vomiting, or hematemesis; No diarrhea or constipation. No melena or hematochezia.  GENITOURINARY: No dysuria, frequency or hematuria  NEUROLOGICAL: No numbness, No HA  SKIN: No itching, rashes  MSk: no joint pain, no muscle pain REVIEW OF SYSTEMS:    CONSTITUTIONAL: No weakness, fevers or chills  EYES/ENT: No visual changes;  No vertigo or throat pain   NECK: No pain or stiffness  RESPIRATORY: No cough, wheezing, hemoptysis; No shortness of breath  CARDIOVASCULAR: No chest pain or palpitations, has LE edema  GASTROINTESTINAL: No abdominal or epigastric pain. No nausea, vomiting, or hematemesis; No diarrhea or constipation. No melena or hematochezia.  GENITOURINARY: No dysuria, frequency or hematuria  NEUROLOGICAL: No numbness, No HA  SKIN: No itching, rashes  MSK: no joint pain, no muscle pain

## 2018-08-07 NOTE — H&P ADULT - NSHPPHYSICALEXAM_GEN_ALL_CORE
T(C): 36.7 (08-06-18 @ 21:06)  T(F): 98 (08-06-18 @ 21:06), Max: 98 (08-06-18 @ 21:06)  HR: 68 (08-07-18 @ 01:39) (65 - 73)  BP: 164/100 (08-07-18 @ 01:39) (155/98 - 172/97)  RR:  (17 - 18)  SpO2:  (96% - 97%)  Wt(kg): --    PHYSICAL EXAM:  GENERAL: NAD, well-groomed, well-developed  HEAD:  Atraumatic, Normocephalic  EYES: EOMI, PERRLA, conjunctiva and sclera clear  ENMT: No tonsillar erythema, exudates, or enlargement; Moist mucous membranes, Good dentition, No lesions  NECK: Supple, No JVD, Normal thyroid  CHEST/LUNG: Clear to ausculation bilaterally; No rales, rhonchi, wheezing, or rubs  HEART: Regular rate and rhythm; No murmurs, rubs, or gallops  ABDOMEN: Soft, Nontender, Nondistended; Bowel sounds present  EXTREMITIES:  2+ Peripheral Pulses, No clubbing, cyanosis, or edema  LYMPH: No lymphadenopathy noted  SKIN: No rashes or lesions  NERVOUS SYSTEM:  Alert & Oriented X3, Good concentration; Motor Strength 5/5 B/L upper and lower extremities; DTRs 2+ intact and symmetric T(C): 36.7 (08-06-18 @ 21:06)  T(F): 98 (08-06-18 @ 21:06), Max: 98 (08-06-18 @ 21:06)  HR: 68 (08-07-18 @ 01:39) (65 - 73)  BP: 164/100 (08-07-18 @ 01:39) (155/98 - 172/97)  RR:  (17 - 18)  SpO2:  (96% - 97%)  Wt(kg): --    PHYSICAL EXAM:  GENERAL: NAD, well-groomed, well-developed  HEAD:  Atraumatic, Normocephalic  EYES: EOMI, PERRLA, conjunctiva and sclera clear  ENMT: No tonsillar erythema, exudates, or enlargement; Moist mucous membranes  NECK: Supple, No JVD, Normal thyroid  CHEST/LUNG: Clear to ausculation bilaterally; No rales, rhonchi, wheezing, or rubs  HEART: Regular rate and rhythm; No murmurs, rubs, or gallops  ABDOMEN: Soft, Nontender, Nondistended; Bowel sounds present  EXTREMITIES:  2+ Peripheral Pulses, No clubbing or cyanosis has +1 pitting edema with chronic venous insufficiency changes bilaterally with no wamrth. Positive Homans signs on L leg.  LYMPH: No lymphadenopathy noted  SKIN: No rashes or lesions  NERVOUS SYSTEM:  Alert & Oriented X3, Motor Strength 5/5 B/L upper and lower extremities; T(C): 36.7 (08-06-18 @ 21:06)  T(F): 98 (08-06-18 @ 21:06), Max: 98 (08-06-18 @ 21:06)  HR: 68 (08-07-18 @ 01:39) (65 - 73)  BP: 164/100 (08-07-18 @ 01:39) (155/98 - 172/97)  RR:  (17 - 18)  SpO2:  (96% - 97%)  Wt(kg): --    PHYSICAL EXAM:  GENERAL: NAD, well-groomed, well-developed  HEAD:  Atraumatic, Normocephalic  EYES: EOMI, PERRLA, conjunctiva and sclera clear  ENMT: No tonsillar erythema, exudates, or enlargement; Moist mucous membranes  NECK: Supple, No JVD, Normal thyroid  CHEST/LUNG: Clear to ausculation bilaterally; No rales, rhonchi, wheezing, or rubs  HEART: Regular rate and rhythm; No murmurs, rubs, or gallops  ABDOMEN: Soft, Nontender, Nondistended; Bowel sounds present  EXTREMITIES:  2+ Peripheral Pulses, No clubbing or cyanosis has +1 pitting edema with chronic venous insufficiency changes bilaterally with no warmth. Positive Homans signs on L leg.  LYMPH: No lymphadenopathy noted  SKIN: No rashes or lesions  NERVOUS SYSTEM:  Alert & Oriented X3, Motor Strength 5/5 B/L upper and lower extremities;

## 2018-08-07 NOTE — PROGRESS NOTE ADULT - PROBLEM SELECTOR PLAN 1
Suspect venous insufficiency vs. doubt CHF as patient euvolemic without crackles on lung exam, no jvd, and no significant pitting edema. DVT ruled out with doppler study negative for DVT  - pt due for PMD appointment in 2 days, recommend OP follow up  - will increase HCTZ to 25mg po daily as this may help with leg edema and will address patient's HTN.

## 2018-08-07 NOTE — H&P ADULT - ATTENDING COMMENTS
63 M w/ no significant  PMHx  p/w worsening LE edema , no evidence of ischemia , no evidence of HF , renal failure , or liver failure , will r/o DVT , BP elevated will start low dose HCTZ , patient to establish primary f/u.

## 2018-08-07 NOTE — H&P ADULT - PROBLEM SELECTOR PLAN 2
-Pt has uncontrolled BP, which may explain chronic venous insufficiency changes on exam  -Not currently on any medications, would initiate Lisinopril 5mg  -Goal <150/90 -Pt has uncontrolled BP, which may explain chronic venous insufficiency changes on exam  -Not currently on any medications, will initiate HCTZ 12.5mg to help HTN for diuresis.   -Goal <150/90

## 2018-08-07 NOTE — DISCHARGE NOTE ADULT - CARE PLAN
Principal Discharge DX:	Lower extremity edema  Goal:	you will be free of symptoms  Secondary Diagnosis:	Hypertension, unspecified type  Goal:	Your blood pressure will be controlled.  Assessment and plan of treatment:	Continue with your blood pressure medications; eat a heart healthy diet with low salt diet; exercise regularly (consult with your physician or cardiologist first); maintain a heart healthy weight; if you smoke - quit (A resource to help you stop smoking is the [facility] – phone number [phone].); include healthy ways to manage stress. Continue to follow with your primary care physician or cardiologist. Principal Discharge DX:	Lower extremity edema  Goal:	you will be free of symptoms  Assessment and plan of treatment:	lower extremity swelling improving with added medication HCTZ. monitor swelling in your legs. follow up with PCP if swelling worsens  Secondary Diagnosis:	Hypertension, unspecified type  Goal:	Your blood pressure will be controlled.  Assessment and plan of treatment:	Continue with your blood pressure medications; eat a heart healthy diet with low salt diet; exercise regularly (consult with your physician or cardiologist first); maintain a heart healthy weight; if you smoke - quit (A resource to help you stop smoking is the [facility] – phone number [phone].); include healthy ways to manage stress. Continue to follow with your primary care physician or cardiologist. Principal Discharge DX:	Lower extremity edema  Goal:	you will be free of symptoms  Assessment and plan of treatment:	Your swelling is likely due to poor venous circulation- lower extremity swelling improved with added medication HCTZ which will help swelling as well as lower your blood pressure. Monitor swelling in your legs. Be sure to follow up with your primary care doctor at your upcoming appointment.  Secondary Diagnosis:	Hypertension, unspecified type  Goal:	Your blood pressure will be controlled.  Assessment and plan of treatment:	Continue with your blood pressure medications; eat a heart healthy diet with low salt diet; exercise regularly (consult with your physician or cardiologist first); maintain a heart healthy weight; if you smoke - quit (A resource to help you stop smoking is the [facility] – phone number [phone].); include healthy ways to manage stress. Continue to follow with your primary care physician or cardiologist.

## 2018-08-08 ENCOUNTER — APPOINTMENT (OUTPATIENT)
Dept: INTERNAL MEDICINE | Facility: CLINIC | Age: 63
End: 2018-08-08
Payer: COMMERCIAL

## 2018-08-08 VITALS
WEIGHT: 250 LBS | HEIGHT: 72 IN | HEART RATE: 86 BPM | OXYGEN SATURATION: 96 % | DIASTOLIC BLOOD PRESSURE: 80 MMHG | SYSTOLIC BLOOD PRESSURE: 140 MMHG | BODY MASS INDEX: 33.86 KG/M2

## 2018-08-08 DIAGNOSIS — Z83.79 FAMILY HISTORY OF OTHER DISEASES OF THE DIGESTIVE SYSTEM: ICD-10-CM

## 2018-08-08 DIAGNOSIS — R60.9 EDEMA, UNSPECIFIED: ICD-10-CM

## 2018-08-08 DIAGNOSIS — I25.10 ATHEROSCLEROTIC HEART DISEASE OF NATIVE CORONARY ARTERY W/OUT ANGINA PECTORIS: ICD-10-CM

## 2018-08-08 DIAGNOSIS — I10 ESSENTIAL (PRIMARY) HYPERTENSION: ICD-10-CM

## 2018-08-08 DIAGNOSIS — R73.03 PREDIABETES.: ICD-10-CM

## 2018-08-08 PROCEDURE — 99495 TRANSJ CARE MGMT MOD F2F 14D: CPT

## 2018-08-08 RX ORDER — ASPIRIN 81 MG
81 TABLET, DELAYED RELEASE (ENTERIC COATED) ORAL
Refills: 0 | Status: ACTIVE | COMMUNITY

## 2018-08-09 PROBLEM — Z83.79 FAMILY HISTORY OF HEPATIC CIRRHOSIS: Status: ACTIVE | Noted: 2018-08-09

## 2018-08-09 LAB
APPEARANCE: CLEAR
BACTERIA: NEGATIVE
BILIRUBIN URINE: NEGATIVE
BLOOD URINE: NEGATIVE
COLOR: YELLOW
GLUCOSE QUALITATIVE U: NEGATIVE MG/DL
HYALINE CASTS: 1 /LPF
KETONES URINE: NEGATIVE
LEUKOCYTE ESTERASE URINE: NEGATIVE
MICROSCOPIC-UA: NORMAL
NITRITE URINE: NEGATIVE
PH URINE: 6.5
PROTEIN URINE: NEGATIVE MG/DL
RED BLOOD CELLS URINE: 3 /HPF
SPECIFIC GRAVITY URINE: 1.03
SQUAMOUS EPITHELIAL CELLS: 0 /HPF
UROBILINOGEN URINE: 1 MG/DL
WHITE BLOOD CELLS URINE: 1 /HPF

## 2018-08-09 NOTE — PLAN
[FreeTextEntry1] : \par 1. TTE - r/o structural abn and evaluate EF\par \par 2. U/A - r/o proteinuria and glycosuria\par \par 3. Increase HCTZ to q12h given inc BP today and signficant edema; \par \par 4. RTO in 2 weeks with me;

## 2018-08-09 NOTE — PHYSICAL EXAM
[Normal] : normal gait, coordination grossly intact, no focal deficits [Moderate Complexity requires multiple possible diagnoses and/or the management options, moderate complexity of the medical data (tests, etc.) to be reviewed, and moderate risk of significant complications, morbidity, and/or mortality as well as co-morbidi] : Moderate Complexity [de-identified] : Tall [de-identified] : 2+ lower extremity edema L>R [de-identified] : + erythema to LLE mild erythema to RLE ; + venous stasis changes starting to LE bilaterally

## 2018-08-09 NOTE — COUNSELING
[Healthy eating counseling provided] : healthy eating [Low Salt Diet] : Low salt diet [None] : None [Needs reinforcement, provided] : Patient needs reinforcement on understanding lifestyle changes and  the steps needed to achieve self management goals and reinforcement was provided

## 2018-08-09 NOTE — ASSESSMENT
[FreeTextEntry1] : \par Mr. Lala is a 64 y/o male  who presents as a new patient to me, after recent hospitalization for lower extremity edema thought to be 2/2 venous insufficiency. \par \par It is unclear whether pt has true venous insufficiency vs. secondary causes for peripheral edema; \par \par Studies reviewed from Hospital: \par EKG within normal limits - ? left axis deviation but no other arrhythmia noted\par LE Doppler - negative to DVT b/l\par BUN/Cr - within normal limits\par Prot/Alb - within normal limits\par A1C - prediabetes range\par \par No hx of CAD as per patient; therefore unclear whether pt with true CAD in the past\par

## 2018-08-09 NOTE — HISTORY OF PRESENT ILLNESS
[Post-hospitalization from ___ Hospital] : Post-hospitalization from [unfilled] Hospital [Admitted on: ___] : The patient was admitted on [unfilled] [Discharged on ___] : discharged on [unfilled] [Discharge Summary] : discharge summary [Pertinent Labs] : pertinent labs [Radiology Findings] : radiology findings [Discharge Med List] : discharge medication list [Med Reconciliation] : medication reconciliation has been completed [FreeTextEntry2] : \par 63 year old man with no known PMHx presenting with worsening LE edema. Pt \par states over the course of the past month his weight has been increasing \par drastically that his pant size has increased from 40 to 46. He also reports \par having a "skin infection" last year that was treated with cefpodoxime. Pt \par reports his LE becoming more swollen over the past 2 weeks, not improving with \par cefpodoxime, and he has noticed weeping from his Left Lower Extremity calf. \par Admitted for workup of leg swelling r/o DVT. Pt was euvolemic on exam without \par lung crackles or jvd, had clear cxr so CHF was of low suspicion. Bilateral \par dopplers were ordered and negative for DVT, suggesting that patient's LE edema \par was 2/2 venous insufficiency. He was deemed stable for discharge to home with \par instructions to f/u with his PMD at his appointment the following day. \par \par \par \par __ \par \par Lower extremity edema for the last few months; \par some nocturia but not terribly a lot\par Occupation: [employer] /[employer]\par \par Denies: chest pain, palpitations, headaches, shortness of breath (w/ or w/o exertion), vision or hearing changes, \par \par

## 2018-08-22 ENCOUNTER — APPOINTMENT (OUTPATIENT)
Dept: INTERNAL MEDICINE | Facility: CLINIC | Age: 63
End: 2018-08-22

## 2018-08-24 ENCOUNTER — APPOINTMENT (OUTPATIENT)
Dept: CV DIAGNOSITCS | Facility: HOSPITAL | Age: 63
End: 2018-08-24

## 2018-12-14 ENCOUNTER — RX RENEWAL (OUTPATIENT)
Age: 63
End: 2018-12-14

## 2018-12-14 RX ORDER — HYDROCHLOROTHIAZIDE 25 MG/1
25 TABLET ORAL
Qty: 60 | Refills: 1 | Status: ACTIVE | COMMUNITY
Start: 2018-12-14 | End: 1900-01-01

## 2019-02-28 ENCOUNTER — RX RENEWAL (OUTPATIENT)
Age: 64
End: 2019-02-28

## 2021-01-15 NOTE — PATIENT PROFILE ADULT. - PRO SERVICES AT DISCH
Called pt LMTCB- if pt CB please confirm he was requesting refill and if so inform him Dr. Noy Lao would like him to f/u in office if still having pain. none
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
## 2021-10-06 ENCOUNTER — EMERGENCY (EMERGENCY)
Facility: HOSPITAL | Age: 66
LOS: 1 days | End: 2021-10-06
Payer: COMMERCIAL

## 2021-10-06 VITALS
WEIGHT: 240.08 LBS | HEIGHT: 74 IN | DIASTOLIC BLOOD PRESSURE: 83 MMHG | RESPIRATION RATE: 20 BRPM | SYSTOLIC BLOOD PRESSURE: 166 MMHG | TEMPERATURE: 99 F | HEART RATE: 70 BPM | OXYGEN SATURATION: 94 %

## 2021-10-06 PROCEDURE — L9991: CPT

## 2022-09-09 NOTE — ED ADULT NURSE NOTE - CAS TRG GENERAL AIRWAY, MLM
Patient comfortable no acute events.  Chest tube minimal serosanguineous output.    After discussing with the family and the patient should likely need to go [facility] some [facility].  Will repeat her nuclear medicine study in approximately 2 weeks time.   Patent

## 2024-02-21 ENCOUNTER — EMERGENCY (EMERGENCY)
Facility: HOSPITAL | Age: 69
LOS: 0 days | Discharge: HOME | End: 2024-02-22
Attending: EMERGENCY MEDICINE
Payer: COMMERCIAL

## 2024-02-21 VITALS — HEIGHT: 74 IN | WEIGHT: 250 LBS

## 2024-02-21 DIAGNOSIS — I10 ESSENTIAL (PRIMARY) HYPERTENSION: ICD-10-CM

## 2024-02-21 DIAGNOSIS — L03.116 CELLULITIS OF LEFT LOWER LIMB: ICD-10-CM
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
## 2024-02-21 DIAGNOSIS — S60.511A ABRASION OF RIGHT HAND, INITIAL ENCOUNTER: ICD-10-CM

## 2024-02-21 LAB
ABO RH CONFIRMATION: SIGNIFICANT CHANGE UP
ALBUMIN SERPL ELPH-MCNC: 3.7 G/DL — SIGNIFICANT CHANGE UP (ref 3.3–5)
ALP SERPL-CCNC: 113 U/L — SIGNIFICANT CHANGE UP (ref 40–120)
ALT FLD-CCNC: 39 U/L — SIGNIFICANT CHANGE UP (ref 12–78)
ANION GAP SERPL CALC-SCNC: 0 MMOL/L — LOW (ref 5–17)
APTT BLD: 30.7 SEC — SIGNIFICANT CHANGE UP (ref 24.5–35.6)
AST SERPL-CCNC: 20 U/L — SIGNIFICANT CHANGE UP (ref 15–37)
BASOPHILS # BLD AUTO: 0.02 K/UL — SIGNIFICANT CHANGE UP (ref 0–0.2)
BASOPHILS NFR BLD AUTO: 0.3 % — SIGNIFICANT CHANGE UP (ref 0–2)
BILIRUB SERPL-MCNC: 0.4 MG/DL — SIGNIFICANT CHANGE UP (ref 0.2–1.2)
BLD GP AB SCN SERPL QL: SIGNIFICANT CHANGE UP
BUN SERPL-MCNC: 20 MG/DL — SIGNIFICANT CHANGE UP (ref 7–23)
CALCIUM SERPL-MCNC: 9.1 MG/DL — SIGNIFICANT CHANGE UP (ref 8.5–10.1)
CHLORIDE SERPL-SCNC: 110 MMOL/L — HIGH (ref 96–108)
CO2 SERPL-SCNC: 30 MMOL/L — SIGNIFICANT CHANGE UP (ref 22–31)
CREAT SERPL-MCNC: 0.79 MG/DL — SIGNIFICANT CHANGE UP (ref 0.5–1.3)
EGFR: 98 ML/MIN/1.73M2 — SIGNIFICANT CHANGE UP
EOSINOPHIL # BLD AUTO: 0.34 K/UL — SIGNIFICANT CHANGE UP (ref 0–0.5)
EOSINOPHIL NFR BLD AUTO: 4.7 % — SIGNIFICANT CHANGE UP (ref 0–6)
GLUCOSE SERPL-MCNC: 120 MG/DL — HIGH (ref 70–99)
HCT VFR BLD CALC: 43.3 % — SIGNIFICANT CHANGE UP (ref 39–50)
HGB BLD-MCNC: 14.4 G/DL — SIGNIFICANT CHANGE UP (ref 13–17)
IMM GRANULOCYTES NFR BLD AUTO: 1 % — HIGH (ref 0–0.9)
INR BLD: 0.97 RATIO — SIGNIFICANT CHANGE UP (ref 0.85–1.18)
LACTATE SERPL-SCNC: 1.1 MMOL/L — SIGNIFICANT CHANGE UP (ref 0.7–2)
LYMPHOCYTES # BLD AUTO: 2.51 K/UL — SIGNIFICANT CHANGE UP (ref 1–3.3)
LYMPHOCYTES # BLD AUTO: 34.7 % — SIGNIFICANT CHANGE UP (ref 13–44)
MCHC RBC-ENTMCNC: 29.7 PG — SIGNIFICANT CHANGE UP (ref 27–34)
MCHC RBC-ENTMCNC: 33.3 GM/DL — SIGNIFICANT CHANGE UP (ref 32–36)
MCV RBC AUTO: 89.3 FL — SIGNIFICANT CHANGE UP (ref 80–100)
MONOCYTES # BLD AUTO: 0.66 K/UL — SIGNIFICANT CHANGE UP (ref 0–0.9)
MONOCYTES NFR BLD AUTO: 9.1 % — SIGNIFICANT CHANGE UP (ref 2–14)
NEUTROPHILS # BLD AUTO: 3.63 K/UL — SIGNIFICANT CHANGE UP (ref 1.8–7.4)
NEUTROPHILS NFR BLD AUTO: 50.2 % — SIGNIFICANT CHANGE UP (ref 43–77)
PLATELET # BLD AUTO: 210 K/UL — SIGNIFICANT CHANGE UP (ref 150–400)
POTASSIUM SERPL-MCNC: 3.8 MMOL/L — SIGNIFICANT CHANGE UP (ref 3.5–5.3)
POTASSIUM SERPL-SCNC: 3.8 MMOL/L — SIGNIFICANT CHANGE UP (ref 3.5–5.3)
PROT SERPL-MCNC: 7.7 GM/DL — SIGNIFICANT CHANGE UP (ref 6–8.3)
PROTHROM AB SERPL-ACNC: 11 SEC — SIGNIFICANT CHANGE UP (ref 9.5–13)
RBC # BLD: 4.85 M/UL — SIGNIFICANT CHANGE UP (ref 4.2–5.8)
RBC # FLD: 13.2 % — SIGNIFICANT CHANGE UP (ref 10.3–14.5)
SODIUM SERPL-SCNC: 140 MMOL/L — SIGNIFICANT CHANGE UP (ref 135–145)
WBC # BLD: 7.23 K/UL — SIGNIFICANT CHANGE UP (ref 3.8–10.5)
WBC # FLD AUTO: 7.23 K/UL — SIGNIFICANT CHANGE UP (ref 3.8–10.5)

## 2024-02-21 PROCEDURE — 87040 BLOOD CULTURE FOR BACTERIA: CPT

## 2024-02-21 PROCEDURE — 85025 COMPLETE CBC W/AUTO DIFF WBC: CPT

## 2024-02-21 PROCEDURE — 96374 THER/PROPH/DIAG INJ IV PUSH: CPT

## 2024-02-21 PROCEDURE — 86901 BLOOD TYPING SEROLOGIC RH(D): CPT

## 2024-02-21 PROCEDURE — 85610 PROTHROMBIN TIME: CPT

## 2024-02-21 PROCEDURE — 85730 THROMBOPLASTIN TIME PARTIAL: CPT

## 2024-02-21 PROCEDURE — 99284 EMERGENCY DEPT VISIT MOD MDM: CPT

## 2024-02-21 PROCEDURE — 93971 EXTREMITY STUDY: CPT | Mod: LT

## 2024-02-21 PROCEDURE — 96375 TX/PRO/DX INJ NEW DRUG ADDON: CPT

## 2024-02-21 PROCEDURE — 83605 ASSAY OF LACTIC ACID: CPT

## 2024-02-21 PROCEDURE — 80053 COMPREHEN METABOLIC PANEL: CPT

## 2024-02-21 PROCEDURE — 99284 EMERGENCY DEPT VISIT MOD MDM: CPT | Mod: 25

## 2024-02-21 PROCEDURE — 93971 EXTREMITY STUDY: CPT | Mod: 26,LT

## 2024-02-21 PROCEDURE — 86850 RBC ANTIBODY SCREEN: CPT

## 2024-02-21 PROCEDURE — 86900 BLOOD TYPING SEROLOGIC ABO: CPT

## 2024-02-21 PROCEDURE — 36415 COLL VENOUS BLD VENIPUNCTURE: CPT

## 2024-02-21 RX ORDER — MUPIROCIN 20 MG/G
1 OINTMENT TOPICAL ONCE
Refills: 0 | Status: COMPLETED | OUTPATIENT
Start: 2024-02-21 | End: 2024-02-21

## 2024-02-21 RX ORDER — CEPHALEXIN 500 MG
1 CAPSULE ORAL
Qty: 28 | Refills: 0
Start: 2024-02-21 | End: 2024-02-27

## 2024-02-21 RX ORDER — VANCOMYCIN HCL 1 G
1750 VIAL (EA) INTRAVENOUS ONCE
Refills: 0 | Status: COMPLETED | OUTPATIENT
Start: 2024-02-21 | End: 2024-02-21

## 2024-02-21 RX ORDER — CEFTRIAXONE 500 MG/1
1000 INJECTION, POWDER, FOR SOLUTION INTRAMUSCULAR; INTRAVENOUS ONCE
Refills: 0 | Status: COMPLETED | OUTPATIENT
Start: 2024-02-21 | End: 2024-02-21

## 2024-02-21 RX ADMIN — CEFTRIAXONE 1000 MILLIGRAM(S): 500 INJECTION, POWDER, FOR SOLUTION INTRAMUSCULAR; INTRAVENOUS at 22:04

## 2024-02-21 RX ADMIN — MUPIROCIN 1 APPLICATION(S): 20 OINTMENT TOPICAL at 22:58

## 2024-02-21 RX ADMIN — Medication 250 MILLIGRAM(S): at 22:56
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
## 2024-02-21 NOTE — ED ADULT NURSE REASSESSMENT NOTE - NS ED NURSE REASSESS COMMENT FT1
pt wounds redressed via MD Jiang, pt provided sandwich for dinner, IV abx running, no complaints at this time.

## 2024-02-21 NOTE — ED STATDOCS - SKIN, MLM
right hand: sub-cm blood blister with mild surrounding erythema, no warms no tracking proximally, erythema and warmth to anterior left lower leg with 2 cm superficial sloughing centrally, no drainage, no tracking of erythema proximally

## 2024-02-21 NOTE — ED STATDOCS - PROGRESS NOTE DETAILS
Tacho Duran for ED attending, Dr. Jiang: 69 y/o male presents to the ED c/o left lower extremity cellulitis. Pt states that about a week ago he noticed that area had a blister which he popped himself several day ago. Pt also c/o oozing from wound on right hand which started after he burned it Pt hypertensive, will be placed in the main ED for complete evaluation by another provider. Pt denies hx of HTN.

## 2024-02-21 NOTE — ED PROCEDURE NOTE - GENERAL PROCEDURE DETAILS
Applied surgifoam and gauze compression dressing on Dorsum of right hand ; Applied nonadherent gauze and gauze wrap to left lower extremity

## 2024-02-21 NOTE — ED STATDOCS - CARE PROVIDER_API CALL
Freddy Lopez  Internal Medicine  [address] [place] [id number]  Phone: [phone]  Fax: [phone]  Follow Up Time:

## 2024-02-21 NOTE — ED STATDOCS - CARE PLAN
Principal Discharge DX:	Cellulitis of left lower leg  Secondary Diagnosis:	Abrasion of right hand  Secondary Diagnosis:	HTN (hypertension)   1

## 2024-02-21 NOTE — ED STATDOCS - PATIENT PORTAL LINK FT
You can access the FollowMyHealth Patient Portal offered by [facility] by registering at the following website: [email or web address]. By joining [employer]’s FollowMyHealth portal, you will also be able to view your health information using other applications (apps) compatible with our system.

## 2024-02-21 NOTE — ED STATDOCS - CLINICAL SUMMARY MEDICAL DECISION MAKING FREE TEXT BOX
with wound to dorsum of right hand, Surgifoam and ointment applied and pressure dressing applied. Pt also with left lower extremity cellultis from self induced blister popping will give dose IV antibiotics in ED and sent home on PO antibiotics, US left lower extremity to r/o DVT    22:57: labs WNL, US negative for DVT, pt to f/u with PMD in the next few days for wound treatment

## 2024-02-21 NOTE — ED STATDOCS - NSFOLLOWUPINSTRUCTIONS_ED_ALL_ED_FT
Cellulitis, Adult       Cellulitis is a skin infection. The infected area is usually warm, red, swollen, and tender. This condition occurs most often in the arms and lower legs. The infection can travel to the muscles, blood, and underlying tissue and become serious. It is very important to get treated for this condition.    What are the causes?  Cellulitis is caused by bacteria. The bacteria enter through a break in the skin, such as a cut, burn, insect bite, open sore, or crack.    What increases the risk?  This condition is more likely to occur in people who:    Have a weak body defense system (immune system).  Have open wounds on the skin, such as cuts, burns, bites, and scrapes. Bacteria can enter the body through these open wounds.  Are older than 60 years of age.  Have diabetes.  Have a type of long-lasting (chronic) liver disease (cirrhosis) or kidney disease.  Are obese.  Have a skin condition such as:    Itchy rash (eczema).  Slow movement of blood in the veins (venous stasis).  Fluid buildup below the skin (edema).  Have had radiation therapy.  Use IV drugs.    What are the signs or symptoms?  Symptoms of this condition include:    Redness, streaking, or spotting on the skin.  Swollen area of the skin.  Tenderness or pain when an area of the skin is touched.  Warm skin.  A fever.  Chills.  Blisters.    How is this diagnosed?  This condition is diagnosed based on a medical history and physical exam. You may also have tests, including:    Blood tests.  Imaging tests.    How is this treated?  Treatment for this condition may include:    Medicines, such as antibiotic medicines or medicines to treat allergies (antihistamines).  Supportive care, such as rest and application of cold or warm cloths (compresses) to the skin.  Hospital care, if the condition is severe.    The infection usually starts to get better within 1–2 days of treatment.    Follow these instructions at home:         Medicines    Take over-the-counter and prescription medicines only as told by your health care provider.  If you were prescribed an antibiotic medicine, take it as told by your health care provider. Do not stop taking the antibiotic even if you start to feel better.        General instructions    Drink enough fluid to keep your urine pale yellow.  Do not touch or rub the infected area.  Raise (elevate) the infected area above the level of your heart while you are sitting or lying down.  Apply warm or cold compresses to the affected area as told by your health care provider.  Keep all follow-up visits as told by your health care provider. This is important. These visits let your health care provider make sure a more serious infection is not developing.    Contact a health care provider if:  You have a fever.  Your symptoms do not begin to improve within 1–2 days of starting treatment.  Your bone or joint underneath the infected area becomes painful after the skin has healed.  Your infection returns in the same area or another area.  You notice a swollen bump in the infected area.  You develop new symptoms.  You have a general ill feeling (malaise) with muscle aches and pains.    Get help right away if:  Your symptoms get worse.  You feel very sleepy.  You develop vomiting or diarrhea that persists.  You notice red streaks coming from the infected area.  Your red area gets larger or turns dark in color.    These symptoms may represent a serious problem that is an emergency. Do not wait to see if the symptoms will go away. Get medical help right away. Call your local emergency services (911 in the [place] Do not drive yourself to the hospital.    Summary  Cellulitis is a skin infection. This condition occurs most often in the arms and lower legs.  Treatment for this condition may include medicines, such as antibiotic medicines or antihistamines.  Take over-the-counter and prescription medicines only as told by your health care provider. If you were prescribed an antibiotic medicine, do not stop taking the antibiotic even if you start to feel better.  Contact a health care provider if your symptoms do not begin to improve within 1–2 days of starting treatment or your symptoms get worse.  Keep all follow-up visits as told by your health care provider. This is important. These visits let your health care provider make sure that a more serious infection is not developing.    ADDITIONAL NOTES AND INSTRUCTIONS    Please follow up with your Primary MD in 24-48 hr.  Seek immediate medical care for any new/worsening signs or symptoms. Keep leg elevated as much as possible.    Remove dressings in 24 hours.  Apply thin layer mupirocin ointment 3 times a day for 7 days and cover with bandaid.  Wound check by your doctor within 1-2 days.   antibiotics at pharmacy.      Cellulitis, Adult       Cellulitis is a skin infection. The infected area is usually warm, red, swollen, and tender. This condition occurs most often in the arms and lower legs. The infection can travel to the muscles, blood, and underlying tissue and become serious. It is very important to get treated for this condition.    What are the causes?  Cellulitis is caused by bacteria. The bacteria enter through a break in the skin, such as a cut, burn, insect bite, open sore, or crack.    What increases the risk?  This condition is more likely to occur in people who:    Have a weak body defense system (immune system).  Have open wounds on the skin, such as cuts, burns, bites, and scrapes. Bacteria can enter the body through these open wounds.  Are older than 60 years of age.  Have diabetes.  Have a type of long-lasting (chronic) liver disease (cirrhosis) or kidney disease.  Are obese.  Have a skin condition such as:    Itchy rash (eczema).  Slow movement of blood in the veins (venous stasis).  Fluid buildup below the skin (edema).  Have had radiation therapy.  Use IV drugs.    What are the signs or symptoms?  Symptoms of this condition include:    Redness, streaking, or spotting on the skin.  Swollen area of the skin.  Tenderness or pain when an area of the skin is touched.  Warm skin.  A fever.  Chills.  Blisters.    How is this diagnosed?  This condition is diagnosed based on a medical history and physical exam. You may also have tests, including:    Blood tests.  Imaging tests.    How is this treated?  Treatment for this condition may include:    Medicines, such as antibiotic medicines or medicines to treat allergies (antihistamines).  Supportive care, such as rest and application of cold or warm cloths (compresses) to the skin.  Hospital care, if the condition is severe.    The infection usually starts to get better within 1–2 days of treatment.    Follow these instructions at home:         Medicines    Take over-the-counter and prescription medicines only as told by your health care provider.  If you were prescribed an antibiotic medicine, take it as told by your health care provider. Do not stop taking the antibiotic even if you start to feel better.        General instructions    Drink enough fluid to keep your urine pale yellow.  Do not touch or rub the infected area.  Raise (elevate) the infected area above the level of your heart while you are sitting or lying down.  Apply warm or cold compresses to the affected area as told by your health care provider.  Keep all follow-up visits as told by your health care provider. This is important. These visits let your health care provider make sure a more serious infection is not developing.    Contact a health care provider if:  You have a fever.  Your symptoms do not begin to improve within 1–2 days of starting treatment.  Your bone or joint underneath the infected area becomes painful after the skin has healed.  Your infection returns in the same area or another area.  You notice a swollen bump in the infected area.  You develop new symptoms.  You have a general ill feeling (malaise) with muscle aches and pains.    Get help right away if:  Your symptoms get worse.  You feel very sleepy.  You develop vomiting or diarrhea that persists.  You notice red streaks coming from the infected area.  Your red area gets larger or turns dark in color.    These symptoms may represent a serious problem that is an emergency. Do not wait to see if the symptoms will go away. Get medical help right away. Call your local emergency services (911 in the [place] Do not drive yourself to the hospital.    Summary  Cellulitis is a skin infection. This condition occurs most often in the arms and lower legs.  Treatment for this condition may include medicines, such as antibiotic medicines or antihistamines.  Take over-the-counter and prescription medicines only as told by your health care provider. If you were prescribed an antibiotic medicine, do not stop taking the antibiotic even if you start to feel better.  Contact a health care provider if your symptoms do not begin to improve within 1–2 days of starting treatment or your symptoms get worse.  Keep all follow-up visits as told by your health care provider. This is important. These visits let your health care provider make sure that a more serious infection is not developing.    ADDITIONAL NOTES AND INSTRUCTIONS    Please follow up with your Primary MD in 24-48 hr.  Seek immediate medical care for any new/worsening signs or symptoms.

## 2024-02-21 NOTE — ED ADULT NURSE NOTE - OBJECTIVE STATEMENT
Pt is 67yo male, A&Ox4, breathing unlabored, presenting with c/o 2 wounds that are not healing. Pt endorses having a blister on his L shin that appeared, pt does not recall injuring the area, pt endorses he then "popped the blister with a razor blade" about 1 week ago, pt endorses not sterilizing the blade but using neosporin after. small round wound approx. 1/2 inch noted on L calf with scant yellow exudate and slight redness on the surrounding area. Pt legs appear to be the same size and temperature. Pt also has a new round wound on R Hand next to his thumb from a work injury. Wound on hand is approx 1cm and noted to have dried blood. Pt endorses feeling extra tired recently and has muscle aches, pt endorses hx of Karine Ram "years ago". Pt denies, CP, SOB, pain, NVD, fevers, and PMHx. Labs drawn and sent.

## 2024-02-22 VITALS
DIASTOLIC BLOOD PRESSURE: 86 MMHG | SYSTOLIC BLOOD PRESSURE: 173 MMHG | HEART RATE: 80 BPM | OXYGEN SATURATION: 96 % | RESPIRATION RATE: 18 BRPM | TEMPERATURE: 99 F

## 2024-02-27 LAB
CULTURE RESULTS: SIGNIFICANT CHANGE UP
CULTURE RESULTS: SIGNIFICANT CHANGE UP
SPECIMEN SOURCE: SIGNIFICANT CHANGE UP
SPECIMEN SOURCE: SIGNIFICANT CHANGE UP

## 2024-05-13 ENCOUNTER — EMERGENCY (EMERGENCY)
Facility: HOSPITAL | Age: 69
LOS: 0 days | Discharge: HOME | End: 2024-05-14
Attending: EMERGENCY MEDICINE
Payer: COMMERCIAL

## 2024-05-13 VITALS
TEMPERATURE: 98 F | OXYGEN SATURATION: 98 % | RESPIRATION RATE: 18 BRPM | SYSTOLIC BLOOD PRESSURE: 174 MMHG | HEART RATE: 68 BPM

## 2024-05-13 VITALS
TEMPERATURE: 98 F | WEIGHT: 250 LBS | HEIGHT: 74 IN | RESPIRATION RATE: 18 BRPM | HEART RATE: 67 BPM | OXYGEN SATURATION: 95 % | SYSTOLIC BLOOD PRESSURE: 173 MMHG | DIASTOLIC BLOOD PRESSURE: 96 MMHG

## 2024-05-13 DIAGNOSIS — R19.7 DIARRHEA, UNSPECIFIED: ICD-10-CM

## 2024-05-13 LAB
ALBUMIN SERPL ELPH-MCNC: 3.7 G/DL — SIGNIFICANT CHANGE UP (ref 3.3–5)
ALP SERPL-CCNC: 109 U/L — SIGNIFICANT CHANGE UP (ref 40–120)
ALT FLD-CCNC: 39 U/L — SIGNIFICANT CHANGE UP (ref 12–78)
ANION GAP SERPL CALC-SCNC: 3 MMOL/L — LOW (ref 5–17)
AST SERPL-CCNC: 26 U/L — SIGNIFICANT CHANGE UP (ref 15–37)
BASOPHILS # BLD AUTO: 0 K/UL — SIGNIFICANT CHANGE UP (ref 0–0.2)
BASOPHILS NFR BLD AUTO: 0 % — SIGNIFICANT CHANGE UP (ref 0–2)
BILIRUB SERPL-MCNC: 0.5 MG/DL — SIGNIFICANT CHANGE UP (ref 0.2–1.2)
BUN SERPL-MCNC: 15 MG/DL — SIGNIFICANT CHANGE UP (ref 7–23)
CALCIUM SERPL-MCNC: 9.3 MG/DL — SIGNIFICANT CHANGE UP (ref 8.5–10.1)
CHLORIDE SERPL-SCNC: 109 MMOL/L — HIGH (ref 96–108)
CO2 SERPL-SCNC: 31 MMOL/L — SIGNIFICANT CHANGE UP (ref 22–31)
CREAT SERPL-MCNC: 0.89 MG/DL — SIGNIFICANT CHANGE UP (ref 0.5–1.3)
EGFR: 94 ML/MIN/1.73M2 — SIGNIFICANT CHANGE UP
EOSINOPHIL # BLD AUTO: 0 K/UL — SIGNIFICANT CHANGE UP (ref 0–0.5)
GLUCOSE SERPL-MCNC: 119 MG/DL — HIGH (ref 70–99)
HCT VFR BLD CALC: 44.8 % — SIGNIFICANT CHANGE UP (ref 39–50)
HGB BLD-MCNC: 14.6 G/DL — SIGNIFICANT CHANGE UP (ref 13–17)
LIDOCAIN IGE QN: 38 U/L — SIGNIFICANT CHANGE UP (ref 13–75)
LYMPHOCYTES # BLD AUTO: 1.75 K/UL — SIGNIFICANT CHANGE UP (ref 1–3.3)
LYMPHOCYTES # BLD AUTO: 22 % — SIGNIFICANT CHANGE UP (ref 13–44)
MAGNESIUM SERPL-MCNC: 2.1 MG/DL — SIGNIFICANT CHANGE UP (ref 1.6–2.6)
MCHC RBC-ENTMCNC: 29.4 PG — SIGNIFICANT CHANGE UP (ref 27–34)
MCHC RBC-ENTMCNC: 32.6 GM/DL — SIGNIFICANT CHANGE UP (ref 32–36)
MCV RBC AUTO: 90.1 FL — SIGNIFICANT CHANGE UP (ref 80–100)
MONOCYTES # BLD AUTO: 0.48 K/UL — SIGNIFICANT CHANGE UP (ref 0–0.9)
MONOCYTES NFR BLD AUTO: 6 % — SIGNIFICANT CHANGE UP (ref 2–14)
NEUTROPHILS # BLD AUTO: 5.66 K/UL — SIGNIFICANT CHANGE UP (ref 1.8–7.4)
NEUTROPHILS NFR BLD AUTO: 71 % — SIGNIFICANT CHANGE UP (ref 43–77)
PLATELET # BLD AUTO: 192 K/UL — SIGNIFICANT CHANGE UP (ref 150–400)
POTASSIUM SERPL-MCNC: 4.2 MMOL/L — SIGNIFICANT CHANGE UP (ref 3.5–5.3)
POTASSIUM SERPL-SCNC: 4.2 MMOL/L — SIGNIFICANT CHANGE UP (ref 3.5–5.3)
PROT SERPL-MCNC: 7.9 GM/DL — SIGNIFICANT CHANGE UP (ref 6–8.3)
RBC # BLD: 4.97 M/UL — SIGNIFICANT CHANGE UP (ref 4.2–5.8)
RBC # FLD: 13.6 % — SIGNIFICANT CHANGE UP (ref 10.3–14.5)
SODIUM SERPL-SCNC: 143 MMOL/L — SIGNIFICANT CHANGE UP (ref 135–145)
WBC # BLD: 7.97 K/UL — SIGNIFICANT CHANGE UP (ref 3.8–10.5)
WBC # FLD AUTO: 7.97 K/UL — SIGNIFICANT CHANGE UP (ref 3.8–10.5)

## 2024-05-13 PROCEDURE — 83690 ASSAY OF LIPASE: CPT

## 2024-05-13 PROCEDURE — 83735 ASSAY OF MAGNESIUM: CPT

## 2024-05-13 PROCEDURE — 80053 COMPREHEN METABOLIC PANEL: CPT

## 2024-05-13 PROCEDURE — 96374 THER/PROPH/DIAG INJ IV PUSH: CPT

## 2024-05-13 PROCEDURE — 36415 COLL VENOUS BLD VENIPUNCTURE: CPT

## 2024-05-13 PROCEDURE — 99284 EMERGENCY DEPT VISIT MOD MDM: CPT

## 2024-05-13 PROCEDURE — 85025 COMPLETE CBC W/AUTO DIFF WBC: CPT

## 2024-05-13 PROCEDURE — 87507 IADNA-DNA/RNA PROBE TQ 12-25: CPT

## 2024-05-13 PROCEDURE — 99284 EMERGENCY DEPT VISIT MOD MDM: CPT | Mod: 25

## 2024-05-13 RX ORDER — FAMOTIDINE 10 MG/ML
20 INJECTION INTRAVENOUS ONCE
Refills: 0 | Status: COMPLETED | OUTPATIENT
Start: 2024-05-13 | End: 2024-05-13

## 2024-05-13 RX ORDER — SODIUM CHLORIDE 9 MG/ML
2000 INJECTION INTRAMUSCULAR; INTRAVENOUS; SUBCUTANEOUS ONCE
Refills: 0 | Status: COMPLETED | OUTPATIENT
Start: 2024-05-13 | End: 2024-05-13

## 2024-05-13 RX ADMIN — FAMOTIDINE 20 MILLIGRAM(S): 10 INJECTION INTRAVENOUS at 22:44

## 2024-05-13 RX ADMIN — SODIUM CHLORIDE 2000 MILLILITER(S): 9 INJECTION INTRAMUSCULAR; INTRAVENOUS; SUBCUTANEOUS at 22:44

## 2024-05-13 NOTE — ED ADULT TRIAGE NOTE - CHIEF COMPLAINT QUOTE
Diarrhea x4 days. States he ate a burger and soup and had diarrhea beginning 10 minutes after meal that has not resolved.

## 2024-05-13 NOTE — ED STATDOCS - PATIENT PORTAL LINK FT
You can access the FollowMyHealth Patient Portal offered by [facility] by registering at the following website: [email or web address]. By joining [facility]’s FollowMyHealth portal, you will also be able to view your health information using other applications (apps) compatible with our system.

## 2024-05-13 NOTE — ED STATDOCS - CLINICAL SUMMARY MEDICAL DECISION MAKING FREE TEXT BOX
In summary this 69-year-old male who presents with chief complaint of diarrhea.  Vital signs within normal limits on arrival.  Patient without abdominal tenderness on exam.  Labs demonstrate CBC within normal limits, CMP within normal limits, lipase within normal limits, magnesium within normal limits.  Patient gave a stool sample for GI PCR.  Results pending.  Patient received IV fluids in department.  Okay for discharge home at this time with supportive care.  Will follow GI PCR results, and if needed treat as necessary.  Otherwise can follow-up with his primary care doctor.  Strict return Precautions given for any worsening.  Patient verbalized understanding and agrees with plan at this time.

## 2024-05-13 NOTE — ED STATDOCS - OBJECTIVE STATEMENT
69-year-old male denies any past medical history, who presents with chief complaint of diarrhea.  Patient states that he ate at a diner 4 days ago, broccoli with cheddar, hamburger, fries, gravy, states that shortly after he started having nonbloody diarrhea.  States he has continued to have diarrhea since then.  Denies abdominal pain, nausea, vomiting, fever, chills, or any other symptoms.  Patient has been taking being Kaopectate, twice today, without improvement.  No other concerns.

## 2024-05-13 NOTE — ED STATDOCS - PROGRESS NOTE DETAILS
70 y/o M with no PMH presents with diarrhea. Denies fever, chills, abdominal pain, nausea, vomiting. No change with OTC medications at home. PE: Well appearing. Cardiac: s1s2, RRR. Lungs: CTAB. Abdomen: NBS x4 soft, nontender. A/P: diarrhea, will check labs, provide IVF, reassess. - Zachary Walter PA-C Labs with no actionable findings. GI PCR pending. Will dc home. - Zachary Walter PA-C

## 2024-05-13 NOTE — ED STATDOCS - DATE/TIME 2
Continue Regimen: Azelaic acid gel QD-BID\\nDoxycycline 50mg qd
Detail Level: Zone
13-May-2024 23:30

## 2024-05-13 NOTE — ED STATDOCS - CARE PROVIDER_API CALL
Marcial Logan  Gastroenterology  [address] [place] [address]  Phone: [phone]  Fax: [phone]  Follow Up Time:

## 2024-05-14 LAB
CAMPYLOBACTER DNA SPEC NAA+PROBE: DETECTED
DACRYOCYTES BLD QL SMEAR: SLIGHT — SIGNIFICANT CHANGE UP
EOSINOPHIL NFR BLD AUTO: 0 % — SIGNIFICANT CHANGE UP (ref 0–6)
GI PCR PANEL: DETECTED
MANUAL SMEAR VERIFICATION: SIGNIFICANT CHANGE UP
NRBC # BLD: 0 /100 WBCS — SIGNIFICANT CHANGE UP (ref 0–0)
NRBC # BLD: SIGNIFICANT CHANGE UP /100 WBCS (ref 0–0)
PLAT MORPH BLD: NORMAL — SIGNIFICANT CHANGE UP
RBC BLD AUTO: ABNORMAL
SICKLE CELLS BLD QL SMEAR: SIGNIFICANT CHANGE UP
VARIANT LYMPHS # BLD: 1 % — SIGNIFICANT CHANGE UP (ref 0–6)

## 2024-05-14 RX ORDER — AZITHROMYCIN 500 MG/1
1 TABLET, FILM COATED ORAL
Qty: 5 | Refills: 0
Start: 2024-05-14 | End: 2024-05-18

## 2024-05-28 ENCOUNTER — EMERGENCY (EMERGENCY)
Facility: HOSPITAL | Age: 69
LOS: 0 days | Discharge: HOME | End: 2024-05-29
Attending: STUDENT IN AN ORGANIZED HEALTH CARE EDUCATION/TRAINING PROGRAM
Payer: COMMERCIAL

## 2024-05-28 VITALS — HEIGHT: 74 IN

## 2024-05-28 DIAGNOSIS — M79.642 PAIN IN LEFT HAND: ICD-10-CM

## 2024-05-28 DIAGNOSIS — M79.602 PAIN IN LEFT ARM: ICD-10-CM

## 2024-05-28 DIAGNOSIS — S43.402A UNSPECIFIED SPRAIN OF LEFT SHOULDER JOINT, INITIAL ENCOUNTER: ICD-10-CM
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
## 2024-05-28 DIAGNOSIS — S60.222A CONTUSION OF LEFT HAND, INITIAL ENCOUNTER: ICD-10-CM
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
## 2024-05-28 PROCEDURE — 73030 X-RAY EXAM OF SHOULDER: CPT | Mod: LT

## 2024-05-28 PROCEDURE — 99284 EMERGENCY DEPT VISIT MOD MDM: CPT

## 2024-05-28 PROCEDURE — 99284 EMERGENCY DEPT VISIT MOD MDM: CPT | Mod: 25

## 2024-05-28 PROCEDURE — 73130 X-RAY EXAM OF HAND: CPT | Mod: LT

## 2024-05-28 RX ORDER — IBUPROFEN 200 MG
600 TABLET ORAL ONCE
Refills: 0 | Status: COMPLETED | OUTPATIENT
Start: 2024-05-28 | End: 2024-05-28

## 2024-05-28 RX ORDER — ACETAMINOPHEN 500 MG
650 TABLET ORAL ONCE
Refills: 0 | Status: COMPLETED | OUTPATIENT
Start: 2024-05-28 | End: 2024-05-28

## 2024-05-28 NOTE — ED STATDOCS - NSFOLLOWUPINSTRUCTIONS_ED_ALL_ED_FT
you are seen and evaluated for left hand pain left shoulder pain.  The x-ray showed no large obvious fractures or dislocation.  Take Tylenol and/or Motrin every 4-6 hours as needed for pain and apply ice to improve swelling.  Follow-up with your primary care doctor within 1 to 2 days and the orthopedic surgeon within 1 week. Return to the Emergency Department for worsening or persistent symptoms, and/or ANY NEW OR CONCERNING SYMPTOMS. If you have issues obtaining follow up, please call: [phone] or [phone]  to obtain a doctor or specialist who takes your insurance in your area.

## 2024-05-28 NOTE — ED STATDOCS - OBJECTIVE STATEMENT
68 y/o M with no pertinent PMHx presents to the ED c/o L hand and arm pain s/p tripping on a piece of metal attached to a life-gate and falling onto the street. Denies head strike. Endorses that he is having trouble with ROM of his L shoulder. Advil taken for pain this morning. Pt also iced his hand. Pt does not take prescription medications. NKDA. Pt states that he has a doctor but does not see him very often.

## 2024-05-28 NOTE — ED STATDOCS - PHYSICAL EXAMINATION
Form given to pcp    Form signed faxed and sent to scanning.    
Constitutional: well appearing, NAD AAOx3  Eyes: EOMI, PERRL  Head: Normocephalic atraumatic  Mouth: no airway obstruction, posterior oropharynx clear without erythema or exudate  Neck: supple  Cardiac: regular rate and rhythm, no MRG  Resp: Lungs CTAB  GI: Abd s/nt/nd  Neuro: CN2-12 intact, strength 5/5x4, sensation grossly intact  Skin: No rashes   MSK: tenderness to proximal L humerus, limited ROM secondary to pain, tenderness to lateral aspect of L hand, limited ROM of digits, L wrist nontender, normal ROM, no snuff box tenderness

## 2024-05-28 NOTE — ED STATDOCS - PATIENT PORTAL LINK FT
You can access the FollowMyHealth Patient Portal offered by [facility] by registering at the following website: [email or web address]. By joining [facility]’s FollowMyHealth portal, you will also be able to view your health information using other applications (apps) compatible with our system.

## 2024-05-28 NOTE — ED STATDOCS - CLINICAL SUMMARY MEDICAL DECISION MAKING FREE TEXT BOX
XR, pain control, monitor, reassess. XR, pain control, monitor, reassess.X-ray imaging of left shoulder and hand show no obvious large fracture or dislocation.  On reassessment patient is moving both the shoulder and the hand well and pain is controlled.  Patient advised to follow-up with orthopedic surgeon if he still having pain in 5-6 days as well as primary care doctor.  Instructed to take Tylenol and Motrin as needed for pain.  Given strict return precautions emergency department discharged home in stable condition

## 2024-05-28 NOTE — ED STATDOCS - CARE PROVIDER_API CALL
Jennie Zepeda  Orthopaedic Surgery  [address] [place] [address]  Phone: [phone]  Fax: [phone]  Follow Up Time: 4-6 Days    MARK GROSS, [address],  [address]  Phone: [phone]  Fax: [phone]  Follow Up Time: 4-6 Days

## 2024-05-28 NOTE — ED STATDOCS - PROVIDER TOKENS
PROVIDER:[TOKEN:[[id number]],FOLLOWUP:[4-6 Days]],PROVIDER:[TOKEN:[[id number]],FOLLOWUP:[4-6 Days]]

## 2024-05-29 VITALS
TEMPERATURE: 98 F | SYSTOLIC BLOOD PRESSURE: 180 MMHG | DIASTOLIC BLOOD PRESSURE: 94 MMHG | HEART RATE: 64 BPM | OXYGEN SATURATION: 97 % | RESPIRATION RATE: 17 BRPM

## 2024-05-29 PROCEDURE — 73130 X-RAY EXAM OF HAND: CPT | Mod: 26,LT

## 2024-05-29 PROCEDURE — 73030 X-RAY EXAM OF SHOULDER: CPT | Mod: 26,LT

## 2024-05-29 RX ADMIN — Medication 600 MILLIGRAM(S): at 00:35

## 2024-05-29 RX ADMIN — Medication 650 MILLIGRAM(S): at 00:35

## 2024-06-07 ENCOUNTER — EMERGENCY (EMERGENCY)
Facility: HOSPITAL | Age: 69
LOS: 0 days | Discharge: HOME | End: 2024-06-08
Attending: STUDENT IN AN ORGANIZED HEALTH CARE EDUCATION/TRAINING PROGRAM
Payer: COMMERCIAL

## 2024-06-07 VITALS — HEIGHT: 74 IN | WEIGHT: 250 LBS
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
## 2024-06-07 DIAGNOSIS — M79.601 PAIN IN RIGHT ARM: ICD-10-CM
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
## 2024-06-07 DIAGNOSIS — M25.511 PAIN IN RIGHT SHOULDER: ICD-10-CM

## 2024-06-07 PROCEDURE — 76376 3D RENDER W/INTRP POSTPROCES: CPT

## 2024-06-07 PROCEDURE — 73060 X-RAY EXAM OF HUMERUS: CPT | Mod: RT

## 2024-06-07 PROCEDURE — 73080 X-RAY EXAM OF ELBOW: CPT | Mod: RT

## 2024-06-07 PROCEDURE — 99285 EMERGENCY DEPT VISIT HI MDM: CPT

## 2024-06-07 PROCEDURE — 73030 X-RAY EXAM OF SHOULDER: CPT | Mod: RT

## 2024-06-07 PROCEDURE — 73060 X-RAY EXAM OF HUMERUS: CPT | Mod: 26,RT

## 2024-06-07 PROCEDURE — 73200 CT UPPER EXTREMITY W/O DYE: CPT | Mod: MC,RT

## 2024-06-07 PROCEDURE — 99284 EMERGENCY DEPT VISIT MOD MDM: CPT | Mod: 25

## 2024-06-07 PROCEDURE — 73080 X-RAY EXAM OF ELBOW: CPT | Mod: 26,RT

## 2024-06-07 PROCEDURE — 73030 X-RAY EXAM OF SHOULDER: CPT | Mod: 26,RT

## 2024-06-07 RX ORDER — ACETAMINOPHEN 500 MG
650 TABLET ORAL ONCE
Refills: 0 | Status: COMPLETED | OUTPATIENT
Start: 2024-06-07 | End: 2024-06-07

## 2024-06-07 NOTE — ED STATDOCS - PROGRESS NOTE DETAILS
Patient Name: Joanna Cross  : 1946   MRN: [id number]     Chief Complaint:    Chief Complaint   Patient presents with   • lab results     Pt here for lab results       History of Present Illness: Joanna Cross is a 75 y.o. female who is here today for follow up on breathing and blood work  HPI        Review of Systems:   Review of Systems   Constitutional: Positive for fatigue.   HENT: Negative.    Eyes: Negative.    Respiratory: Positive for shortness of breath.    Cardiovascular: Negative.    Gastrointestinal: Negative.    Neurological: Negative.         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   • COVID-19 vaccine series completed    • Gastrocnemius muscle tear     left medial 91   • Generalized osteoarthritis    • GERD (gastroesophageal reflux disease)    • GIB (gastrointestinal bleeding) 2016    d/t xarelto    • Headache    • Hiatal hernia    • History of shingles    • History of transfusion 2016    no reaction recalled    • Hypothyroidism    • IBS (irritable bowel syndrome)    • Klebsiella pneumonia (HCC)    • Lichen sclerosus    • Mouth problem     mouth guard used since pt bites tongue and lips excessively at night if not- with bipap    • MRSA infection 2018   • Obesity    • On home oxygen therapy     2L of oxygen all the time due to current congestion    • Osteoporosis    • Parkinson's disease (HCC)    • Peripheral vascular disease (HCC)    • Pleurisy    • Pneumonia    • Puerperal sepsis with acute hypoxic respiratory failure (HCC)     emergent intubation- 2016   • Right leg pain     from back issues    • Salivary gland stone    • Sciatic nerve pain    • Seborrheic dermatitis    • Skin cancer     on back    • Type 2  diabetes mellitus (HCC)    • UTI (urinary tract infection)    • Vitamin D deficiency 9/8/2022       Past Surgical History:   Past Surgical History:   Procedure Laterality Date   • ABLATION OF DYSRHYTHMIC FOCUS  05/16/13    Laser Ablation - Rt Leg   • BACK SURGERY      l4-l5 laminectomy    • BICEPS TENDON REPAIR Right     shoulder   • BREAST BIOPSY Left 05/2004    excisional, benign   • BRONCHOSCOPY RIGID / FLEXIBLE      2016   • BUNIONECTOMY Right    • CARDIAC CATHETERIZATION     • CARDIAC CATHETERIZATION N/A 02/01/2019    Procedure: Left Heart Cath;  Surgeon: Albertina Corona MD;  Location:  [facility];  Service: Cardiology   • CHOLECYSTECTOMY     • COLONOSCOPY  2016   • COLONOSCOPY N/A 06/17/2021    Procedure: COLONOSCOPY WITH POLYPECTOMY;  Surgeon: Trenton Sosa MD;  Location:  [facility];  Service: Gastroenterology;  Laterality: N/A;   • CORONARY STENT PLACEMENT      x1 stent   • CYST REMOVAL      left ear, upper left back 2001   • CYSTOSCOPY      x2  18 and 20 -   in 20 urethra dilation    • DIAGNOSTIC LAPAROSCOPY  1981   • ENDOSCOPIC FUNCTIONAL SINUS SURGERY (FESS)  2011   • ENDOSCOPY  2016   • ENTEROSCOPY SMALL BOWEL      with single ballon with fluoro    • HAMMER TOE REPAIR Left    • INCISION AND DRAINAGE OF WOUND  2018    back with wound infection    • INSERT / REPLACE / REMOVE PACEMAKER  11/10/16    [facility]   • JOINT REPLACEMENT     • KNEE ARTHROSCOPY     • LASER ABLATION      right leg 13   • LIPOMA EXCISION  1999    left leg    • LUMBAR LAMINECTOMY DISCECTOMY DECOMPRESSION N/A 07/06/2018    Procedure: LUMBAR LAMINECTOMY L4-5, HEMILAMIINECTOMY RIGHT L5-S1, FORAMINOTOMY L5-S1;  Surgeon: Lencho Gamino MD;  Location:  [facility];  Service: Neurosurgery   • LUMBAR LAMINECTOMY DISCECTOMY DECOMPRESSION N/A 09/19/2018    Procedure: INCISION AND DRAINAGE BACK WITH WOUND EXPLORATION;  Surgeon: Ritchie García MD;  Location:  [facility];  Service:  Neurosurgery   • LUNG BIOPSY Left 2016   • OTHER SURGICAL HISTORY      ct scan of chest and sinuses and lower back    • OTHER SURGICAL HISTORY      various echos    • OTHER SURGICAL HISTORY      electroencephalogram 16,   emg  ncv tests    • OTHER SURGICAL HISTORY      mra   and various mri with xrays, nuclear medicine lung ventilation with perfusion test 2016 with pft    • OTHER SURGICAL HISTORY      barium swallow testing 15, 12, 12   • OTHER SURGICAL HISTORY      vaginal ultrasound- 2012,   vas clementina lower extrem , vas venous duplex lower extrem bilat    • OTHER SURGICAL HISTORY      wdge biopsy spring of lung    • OTHER SURGICAL HISTORY      emergent intubation- hypoxic resp failure    • PACEMAKER IMPLANTATION  2016    sss   • REPLACEMENT TOTAL KNEE Bilateral     left knee , right  per dr acuna    • REPLACEMENT TOTAL KNEE Bilateral    • SHOULDER ARTHROSCOPY Bilateral     -left, - right    • SKIN BIOPSY      skin cancer back    • SKIN CANCER EXCISION      upper righ tback    • MADHAV     • TEETH EXTRACTION      x2   • TUBAL ABDOMINAL LIGATION Bilateral    • WISDOM TOOTH EXTRACTION         Family History:   Family History   Problem Relation Age of Onset   • Kidney disease Mother    • Coronary artery disease Mother    • Hypertension Mother            • Heart disease Mother            • Hyperlipidemia Mother            • Coronary artery disease Father    • Hypertension Father            • Hypothyroidism Father    • Cancer Father         Oral cancer   • Heart disease Father            • Coronary artery disease Brother    • Hypertension Brother    • Heart disease Brother         Multiple stents, by-pass surgery   • Testicular cancer Brother    • Kidney cancer Maternal Uncle    • Testicular cancer Maternal Uncle    • Colon polyps Neg Hx    • Colon cancer Neg Hx        Social History:   Social History     Socioeconomic History   • Marital status:       Spouse name: N/A   • Number of children: 4   • Years of education: College   • Highest education level: Master's degree (e.g., MA, MS, Randi, MEd, MSW, NELLA)   Tobacco Use   • Smoking status: Never Smoker   • Smokeless tobacco: Never Used   Vaping Use   • Vaping Use: Never used   Substance and Sexual Activity   • Alcohol use: Never   • Drug use: No   • Sexual activity: Defer     Partners: Male     Birth control/protection: Post-menopausal       Medications:     Current Outpatient Medications:   •  Accu-Chek Guide test strip, AS DIRECTED THREE TIMES A DAY, Disp: 300 each, Rfl: 1  •  Accu-Chek Softclix Lancets lancets, AS DIRECTED THREE TIMES A DAY, Disp: 300 each, Rfl: 1  •  albuterol (PROVENTIL) (2.5 MG/3ML) 0.083% nebulizer solution, Take 2.5 mg by nebulization Every 4 (Four) Hours As Needed., Disp: , Rfl:   •  albuterol sulfate HFA (Ventolin HFA) 108 (90 Base) MCG/ACT inhaler, Inhale 1 puff Every 4 (Four) Hours As Needed for Wheezing or Shortness of Air., Disp: 8 g, Rfl: 5  •  ALLERGY SERUM INJECTION, Inject  under the skin into the appropriate area as directed 1 (One) Time Per Week., Disp: , Rfl:   •  amantadine (SYMMETREL) 100 MG capsule, Take 100 mg by mouth 2 (Two) Times a Day., Disp: , Rfl:   •  apixaban (Eliquis) 5 MG tablet tablet, Take 1 tablet by mouth Every 12 (Twelve) Hours., Disp: 180 tablet, Rfl: 2  •  aspirin 81 MG EC tablet, Take 81 mg by mouth Daily., Disp: , Rfl:   •  B Complex-C (SUPER B COMPLEX PO), Take 1 tablet by mouth Daily., Disp: , Rfl:   •  baclofen (LIORESAL) 10 MG tablet, Take 1 tablet by mouth Every 8 (Eight) Hours. As needed, Disp: , Rfl:   •  bumetanide (BUMEX) 1 MG tablet, 1 tab po daily as directed, Disp: 90 tablet, Rfl: 0  •  Calcium Carbonate (CALTRATE 600 PO), Take 600 mg by mouth Daily., Disp: , Rfl:   •  carbidopa-levodopa (SINEMET)  MG per tablet, Take  by mouth. 2 tablets at 0600, 1 tablet at 0900, 1200, 1500, 1800, 2100, Disp: , Rfl:   •  Cholecalciferol  2000 units tablet, Take 2,000 Units by mouth 2 (Two) Times a Day., Disp: , Rfl:   •  citalopram (CeleXA) 20 MG tablet, TAKE 1 TABLET DAILY, Disp: 90 tablet, Rfl: 3  •  clindamycin (CLEOCIN) 150 MG capsule, Take 150 mg by mouth Daily. 4 capsules one hour before dental appointments., Disp: , Rfl:   •  clobetasol (TEMOVATE) 0.05 % cream, Apply 1 application topically 2 (Two) Times a Day As Needed (Lichens Sclerosis)., Disp: , Rfl:   •  dofetilide (TIKOSYN) 500 MCG capsule, TAKE 1 CAPSULE EVERY 12 HOURS, Disp: 180 capsule, Rfl: 3  •  donepezil (ARICEPT) 5 MG tablet, Take 5 mg by mouth Every Night., Disp: , Rfl:   •  Emollient (AQUAPHOR ADVANCED THERAPY EX), Apply  topically., Disp: , Rfl:   •  epoetin lizzie (EPOGEN,PROCRIT) 73680 UNIT/ML injection, Inject  under the skin into the appropriate area as directed., Disp: , Rfl:   •  ferrous sulfate 325 (65 FE) MG tablet, Take 325 mg by mouth Daily With Breakfast., Disp: , Rfl:   •  Glucosamine-Chondroit-Calcium (TRIPLE FLEX BONE & JOINT PO), Take 1 tablet by mouth Daily. Move free, Disp: , Rfl:   •  Insulin Glargine (Lantus SoloStar) 100 UNIT/ML injection pen, Inject 12-14 Units under the skin into the appropriate area as directed Daily., Disp: , Rfl:   •  Insulin Pen Needle (B-D UF III MINI PEN NEEDLES) 31G X 5 MM misc, USE TO INJECT INSULIN DAILY, Disp: 100 each, Rfl: 3  •  IPRATROPIUM BROMIDE NA, 1 spray into the nostril(s) as directed by provider 2 (Two) Times a Day As Needed., Disp: , Rfl:   •  Loratadine 10 MG capsule, Take 10 mg by mouth Daily., Disp: , Rfl:   •  metFORMIN (GLUCOPHAGE) 1000 MG tablet, TAKE 1 TABLET TWICE A DAY WITH MEALS, Disp: 180 tablet, Rfl: 3  •  metOLazone (ZAROXOLYN) 2.5 MG tablet, TAKE 1 TABLET DAILY, Disp: 90 tablet, Rfl: 1  •  Multiple Vitamins-Minerals (CENTRUM ULTRA WOMENS PO), Take 1 tablet by mouth Daily., Disp: , Rfl:   •  nitroglycerin (NITROLINGUAL) 0.4 MG/SPRAY spray, Place 1 spray under the tongue Every 5 (Five) Minutes As Needed for  "Chest Pain., Disp: 1 each, Rfl: 6  •  O2 (OXYGEN), Inhale 2 L/min 1 (One) Time. 2L all the time now, Disp: , Rfl:   •  omeprazole (priLOSEC) 40 MG capsule, Take 40 mg by mouth 2 (Two) Times a Day., Disp: , Rfl:   •  pramipexole (MIRAPEX) 1.5 MG tablet, Take 1.5 mg by mouth Every Night., Disp: , Rfl:   •  ranolazine (RANEXA) 500 MG 12 hr tablet, TAKE 1 TABLET EVERY 12 HOURS, Disp: 180 tablet, Rfl: 3  •  senna (SENOKOT) 8.6 MG tablet, Take 1 tablet by mouth Daily., Disp: , Rfl:   •  spironolactone (ALDACTONE) 25 MG tablet, Take 2 tablets by mouth Daily., Disp: 180 tablet, Rfl: 2  •  traMADol (ULTRAM) 50 MG tablet, Take 1 tablet by mouth Every 6 (Six) Hours As Needed for Moderate Pain ., Disp: 30 tablet, Rfl: 2  •  triamcinolone (KENALOG) 0.1 % cream, As Needed., Disp: , Rfl:   •  Unithroid 150 MCG tablet, Take 1 tablet by mouth Daily., Disp: 90 tablet, Rfl: 1  •  valsartan (DIOVAN) 160 MG tablet, TAKE 1 TABLET TWICE A DAY, Disp: 180 tablet, Rfl: 3  •  vitamin C (ASCORBIC ACID) 500 MG tablet, Take 500 mg by mouth Daily. Chewable tablet, Disp: , Rfl:   •  Zinc 50 MG capsule, Take 1 capsule by mouth Daily., Disp: , Rfl:     Allergies:   Allergies   Allergen Reactions   • Toprol Xl [Metoprolol Tartrate] Shortness Of Breath     Extreme fatigue   • Amlodipine Besylate Swelling     Lower extremity (ankles, feet) swelling   • Codeine Unknown (See Comments)     Pt is unaware of what reaction she had   • Entacapone Other (See Comments)     \"extreme weakness in legs - caused several falls, which stopped after discontinuing this medication\"   • Epinephrine Other (See Comments)     6/4/16- had 3 shots to numb mouth to prepare teeth for crowns, the shots contained epi-  Caused pt to have chest discomfort- went to hospital in ambulance, discovered had a fib while there    • Levemir [Insulin Detemir] Hives     Hives / rash around injection site   • Penicillins Hives     Jitteriness    • Xarelto [Rivaroxaban] GI Bleeding     hgb " "dropped to 5.2   • Benztropine Mesylate Unknown - High Severity   • Bactrim [Sulfamethoxazole-Trimethoprim] Nausea Only and Other (See Comments)     Headache -  Cant be taken with tikosyn - septra ds   • Cimetidine Other (See Comments)     Cant be taken with tikosyn    • Ciprofloxacin Diarrhea   • Cogentin [Benztropine] Other (See Comments)     \"uncontrollable body movements\"   • Compazine [Prochlorperazine Edisylate] Other (See Comments)     Dystonic reaction   • Cortisone Other (See Comments)     MAKES BLOOD PRESSURE HIGH    • Duraprep [Antiseptic Products, Misc.] Itching and Rash     RASH AND ITCHING   • Erythromycin Base Other (See Comments)     Cant be taken with tikosyn - z pack and ketek    • Flurandrenolone Other (See Comments)     Cant be taken with tikosyn     Include - levaquin, cipro, norloxacin    • Haldol [Haloperidol Lactate] Other (See Comments)     Dystonic reaction   • Hydralazine Other (See Comments)     Headache    • Hydrochlorothiazide Other (See Comments)     Cant be taken with tikosyn -  Also hydrodiuril, microzide, hydro-par, oretic, esidrix, ezide or any medicine with hct or hctz in name    • Hydrocodone-Acetaminophen Nausea And Vomiting and Dizziness     Headache, nausea    • Levaquin [Levofloxacin] Other (See Comments)     Cannot take due to taking propafenone HCL- severe reaction with mixed.    • Lisinopril Cough   • Macrolides And Ketolides Other (See Comments)     antibx -   Cant be taken with tikosyn    • Statins Myalgia     Leg pain- all statins    • Tarka [Trandolapril-Verapamil Hcl Er] Other (See Comments)     Constipation    • Verapamil Other (See Comments)     Cant be taken with tikosyn - calan, covera-hs, isoptin, verelan or tarka          Physical Exam:  Vital Signs:   Vitals:    09/08/22 1255   BP: 130/64   BP Location: Left arm   Patient Position: Sitting   Cuff Size: Adult   Pulse: 68   Resp: 14   Temp: 98.6 °F (37 °C)   TempSrc: Temporal   SpO2: 97%  Comment: 2 liters " "  Weight: 106 kg (234 lb 1.6 oz)   Height: 157.5 cm (62\")   PainSc:   4   PainLoc: Shoulder     Body mass index is 42.82 kg/m².     Physical Exam  Vitals and nursing note reviewed.   Constitutional:       Appearance: Normal appearance. She is obese.   HENT:      Head: Normocephalic and atraumatic.      Right Ear: Tympanic membrane, ear canal and external ear normal.      Left Ear: Tympanic membrane, ear canal and external ear normal.      Nose: Nose normal.      Mouth/Throat:      Mouth: Mucous membranes are dry.      Pharynx: Oropharynx is clear.   Eyes:      Extraocular Movements: Extraocular movements intact.      Conjunctiva/sclera: Conjunctivae normal.      Pupils: Pupils are equal, round, and reactive to light.   Cardiovascular:      Rate and Rhythm: Normal rate and regular rhythm.      Pulses: Normal pulses.      Heart sounds: Normal heart sounds.   Pulmonary:      Effort: Pulmonary effort is normal.      Breath sounds: Normal breath sounds.   Musculoskeletal:      Cervical back: Normal range of motion and neck supple.   Feet:      Comments:      Neurological:      Mental Status: She is alert.         Procedures      Assessment/Plan:   Diagnoses and all orders for this visit:    1. Acquired hypothyroidism (Primary)  Assessment & Plan:  Slightly over replaced.  Had her Synthroid decreased by endocrinologist.  We will follow.    Orders:  -     Lipid Panel; Future  -     Comprehensive Metabolic Panel; Future  -     Vitamin B12; Future  -     Vitamin D 25 Hydroxy; Future    2. Hypertension, essential  Assessment & Plan:  Discussed with patient to monitor their blood pressure and if systolic blood pressure goes above 140 or diastolic is above 90 to return to clinic.  Take medicines as directed, call for any problems, patient not having or any worrisome symptoms.        Orders:  -     Lipid Panel; Future  -     Comprehensive Metabolic Panel; Future  -     Vitamin B12; Future  -     Vitamin D 25 Hydroxy; " Future    3. Chronic lung disease  Assessment & Plan:  Chronic oxygen therapy.  Doing well with that.  We will follow.    Orders:  -     Lipid Panel; Future  -     Comprehensive Metabolic Panel; Future  -     Vitamin B12; Future  -     Vitamin D 25 Hydroxy; Future    4. Coronary artery disease involving native coronary artery of native heart without angina pectoris  Assessment & Plan:  Risk factor modification.  Patient recently had a stress test by Dr. Corona.    Orders:  -     Lipid Panel; Future  -     Comprehensive Metabolic Panel; Future  -     Vitamin B12; Future  -     Vitamin D 25 Hydroxy; Future    5. Type 2 diabetes mellitus with hyperglycemia, with long-term current use of insulin (HCC)  Assessment & Plan:  Followed by Dr. Gunn.  Last A1c was good.  We will follow.    Orders:  -     Lipid Panel; Future  -     Comprehensive Metabolic Panel; Future  -     Vitamin B12; Future  -     Vitamin D 25 Hydroxy; Future    6. Vitamin B12 deficiency  Assessment & Plan:  Blood work in 3 months    Orders:  -     Lipid Panel; Future  -     Comprehensive Metabolic Panel; Future  -     Vitamin B12; Future  -     Vitamin D 25 Hydroxy; Future    7. Vitamin D deficiency  Assessment & Plan:  Blood work in 3 months    Orders:  -     Lipid Panel; Future  -     Comprehensive Metabolic Panel; Future  -     Vitamin B12; Future  -     Vitamin D 25 Hydroxy; Future    8. Chronic kidney disease, stage 3b (HCC)  Assessment & Plan:  Patient is instructed to not take any NSAIDs.  Medicines as directed.  Stay well-hydrated.      Orders:  -     Lipid Panel; Future  -     Comprehensive Metabolic Panel; Future  -     Vitamin B12; Future  -     Vitamin D 25 Hydroxy; Future    9. Anemia, unspecified type  Assessment & Plan:  Last hemoglobin was 9.  Seeing hematologist.  We will follow.    Orders:  -     Lipid Panel; Future  -     Comprehensive Metabolic Panel; Future  -     Vitamin B12; Future  -     Vitamin D 25 Hydroxy; Future            Follow Up:   Return in about 3 months (around 12/8/2022) for Bloodwork 1 week prior to next appointment.    Reynaldo Fritz MD  [facility]     Answers for HPI/ROS submitted by the patient on 9/6/2022  Please describe your symptoms.: Follow-up on bloodwork.  Also, want to make doctor aware of surgery scheduled on October 24th at [facility]:  Procedure Code:  77084  Arthroplasty, glenohumeral joint; total shoulder (glenoid and proximal humeral replacement (eg total shoulder) and Procedure code: 29388 Tenodesis of long tendon of biceps, Surgeon: Sammy Garcia  Have you had these symptoms before?: Yes  How long have you been having these symptoms?: Greater than 2 weeks  What is the primary reason for your visit?: Other       Crystal Osullivan MD, Attending  Pt received at signout pending CT UE for fracture.   No acute fx on CT.   oupt follow up with ortho.

## 2024-06-07 NOTE — ED STATDOCS - PATIENT PORTAL LINK FT
You can access the FollowMyHealth Patient Portal offered by [facility] by registering at the following website: [email or web address]. By joining [employer]’s FollowMyHealth portal, you will also be able to view your health information using other applications (apps) compatible with our system.

## 2024-06-07 NOTE — ED STATDOCS - NSFOLLOWUPINSTRUCTIONS_ED_ALL_ED_FT
** You were not found to have a fracture. Follow up with orthopedics for shoulder pain. Call to make an appointment  ** Take over the counter Tylenol or Ibuprofen for pain -- follow dosing directions on original bottle.      ** Go to the nearest Emergency Department if you experience any new or concerning symptoms, such as:   - worsening pain, cant feel your right arm or fingers.   - chest pain  - difficulty breathing  - passing out  - unable to eat or drink  - unable to move or feel part of your body  - fever, chills

## 2024-06-07 NOTE — ED STATDOCS - PHYSICAL EXAMINATION
Constitutional: well appearing, NAD AAOx3  Eyes: EOMI, PERRL  Head: Normocephalic atraumatic  Mouth: no airway obstruction, posterior oropharynx clear without erythema or exudate  Neck: supple  Cardiac: regular rate and rhythm, no MRG  Resp: Lungs CTAB  GI: Abd s/nt/nd  Msk: no midline C/T/L spine ttp, right arm no obvious deformity, +ttp distal humerus, no skin changes sensation intact, 2 + distal pulses  Neuro: CN2-12 intact, strength 5/5x4, sensation grossly intact  Skin: No rashes

## 2024-06-07 NOTE — ED ADULT NURSE REASSESSMENT NOTE - NS ED NURSE REASSESS COMMENT FT1
patients states he took 3 of his own advil and states " When I get bad headaches I need to take the advil". patient refused ordered tylenol.

## 2024-06-07 NOTE — ED ADULT TRIAGE NOTE - CHIEF COMPLAINT QUOTE
Pt presents to the ED c/o right arm pain. Pt reports that he tripped and fell at work today hit his head and landed on his right arm. Pt denies LOC or blood thinners use. Pt reports pain of right shoulder, unable to lift arm above head.

## 2024-06-07 NOTE — ED STATDOCS - OBJECTIVE STATEMENT
70 y/o male presents to the ED c/o right arm pain. Pt states he tripped and fell, struck head however denies LOC, headache however landed on right arm and is having pain. Incident occurred around 15:00. Pt took 600mg Motrin around 20:30. Pt denies anticoagulation usage 68 y/o male presents to the ED c/o right arm pain. Pt states he tripped and fell, struck head however denies LOC, headache however landed on right arm and is having pain. Incident occurred around 15:00. Pt took 600mg Motrin around 20:30. Pt denies anticoagulation usage. No numbness tingling weakness, cp, sob, nvd, abdominal pain, back pain

## 2024-06-07 NOTE — ED STATDOCS - CARE PROVIDER_API CALL
Jennie Zepeda  Orthopaedic Surgery  [address] [place] [address]  Phone: [phone]  Fax: [phone]  Follow Up Time: 4-6 Days

## 2024-06-08 VITALS
SYSTOLIC BLOOD PRESSURE: 175 MMHG | OXYGEN SATURATION: 94 % | TEMPERATURE: 98 F | DIASTOLIC BLOOD PRESSURE: 91 MMHG | HEART RATE: 65 BPM | RESPIRATION RATE: 17 BRPM

## 2024-06-08 PROCEDURE — 73200 CT UPPER EXTREMITY W/O DYE: CPT | Mod: 26,RT,MC

## 2024-06-08 PROCEDURE — 76376 3D RENDER W/INTRP POSTPROCES: CPT | Mod: 26

## 2024-07-18 ENCOUNTER — APPOINTMENT (OUTPATIENT)
Dept: GASTROENTEROLOGY | Facility: CLINIC | Age: 69
End: 2024-07-18

## 2025-01-21 ENCOUNTER — EMERGENCY (EMERGENCY)
Facility: HOSPITAL | Age: 70
LOS: 0 days | Discharge: HOME | End: 2025-01-21
Attending: EMERGENCY MEDICINE
Payer: COMMERCIAL

## 2025-01-21 VITALS
RESPIRATION RATE: 18 BRPM | OXYGEN SATURATION: 97 % | DIASTOLIC BLOOD PRESSURE: 73 MMHG | TEMPERATURE: 98 F | HEART RATE: 82 BPM | WEIGHT: 255.3 LBS | SYSTOLIC BLOOD PRESSURE: 151 MMHG

## 2025-01-21 VITALS — WEIGHT: 255.3 LBS | HEIGHT: 73 IN

## 2025-01-21 DIAGNOSIS — R53.1 WEAKNESS: ICD-10-CM

## 2025-01-21 DIAGNOSIS — R05.1 ACUTE COUGH: ICD-10-CM

## 2025-01-21 PROCEDURE — 99283 EMERGENCY DEPT VISIT LOW MDM: CPT | Mod: 25

## 2025-01-21 PROCEDURE — 71046 X-RAY EXAM CHEST 2 VIEWS: CPT

## 2025-01-21 PROCEDURE — 94640 AIRWAY INHALATION TREATMENT: CPT

## 2025-01-21 PROCEDURE — 71046 X-RAY EXAM CHEST 2 VIEWS: CPT | Mod: 26

## 2025-01-21 PROCEDURE — 99284 EMERGENCY DEPT VISIT MOD MDM: CPT

## 2025-01-21 RX ORDER — ALBUTEROL SULFATE 90 UG/1
2 INHALANT RESPIRATORY (INHALATION) ONCE
Refills: 0 | Status: COMPLETED | OUTPATIENT
Start: 2025-01-21 | End: 2025-01-21

## 2025-01-21 RX ORDER — BENZONATATE 100 MG
1 CAPSULE ORAL
Qty: 21 | Refills: 0
Start: 2025-01-21 | End: 2025-01-27

## 2025-01-21 RX ORDER — BENZONATATE 100 MG
100 CAPSULE ORAL ONCE
Refills: 0 | Status: COMPLETED | OUTPATIENT
Start: 2025-01-21 | End: 2025-01-21

## 2025-01-21 RX ADMIN — ALBUTEROL SULFATE 2 PUFF(S): 90 INHALANT RESPIRATORY (INHALATION) at 21:37

## 2025-01-21 RX ADMIN — Medication 100 MILLIGRAM(S): at 21:36

## 2025-01-21 NOTE — ED ADULT NURSE NOTE - OBJECTIVE STATEMENT
Pt presents to ED w c/o generalized weakness, and cough x2 days. Denies sob, fever, chills, dizziness, numbness/tingling. States "I feel tired". Pt A&O4 w clear speech and follows commands, pt states he has difficulty ambulating d/t need for knee replacement. O2 sat 97% on RA, respirations even and unlabogred.

## 2025-01-21 NOTE — ED STATDOCS - CLINICAL SUMMARY MEDICAL DECISION MAKING FREE TEXT BOX
Vital signs within normal limits.  Physical exam was unremarkable.  Chest x-ray was performed and interpreted by myself to reveal no acute findings.  Likely viral syndrome.  Patient was given albuterol and Tessalon in the department..  Discharged home in good condition with supportive care.  Strict return precautions given for any worsening.  Patient verbalized understanding and agreed plan.

## 2025-01-21 NOTE — ED STATDOCS - PATIENT PORTAL LINK FT
You can access the FollowMyHealth Patient Portal offered by [facility] by registering at the following website: [email or web address]. By joining [employer]’s FollowMyHealth portal, you will also be able to view your health information using other applications (apps) compatible with our system.

## 2025-01-21 NOTE — ED ADULT TRIAGE NOTE - CHIEF COMPLAINT QUOTE
Pt presents to ED w c/o generalized weakness, and cough x2 days. Denies sob, fever, chills, dizziness, numbness/tingling. States "I feel tired". Pt A&O4 w clear speech and follows commands, pt states he has difficulty ambulating d/t need for knee replacement. O2 sat 97% on RA, respirations even and unlabored.

## 2025-01-21 NOTE — ED STATDOCS - NSFOLLOWUPINSTRUCTIONS_ED_ALL_ED_FT
Cough, Adult  Coughing is a reflex that clears your throat and airways (respiratory system). It helps heal and protect your lungs. It is normal to cough from time to time. A cough that happens with other symptoms or that lasts a long time may be a sign of a condition that needs treatment. A short-term (acute) cough may only last 2–3 weeks. A long-term (chronic) cough may last 8 or more weeks.    Coughing is often caused by:  Diseases, such as:  An infection of the respiratory system.  Asthma or other heart or lung diseases.  Gastroesophageal reflux. This is when acid comes back up from the stomach.  Breathing in things that irritate your lungs.  Allergies.  Postnasal drip. This is when mucus runs down the back of your throat.  Smoking.  Some medicines.  Follow these instructions at home:  Medicines    Take over-the-counter and prescription medicines only as told by your health care provider.  Talk with your provider before you take cough medicine (cough suppressants).  Eating and drinking    Do not drink alcohol.  Avoid caffeine.  Drink enough fluid to keep your pee (urine) pale yellow.  Lifestyle    Avoid cigarette smoke.  Do not use any products that contain nicotine or tobacco. These products include cigarettes, chewing tobacco, and vaping devices, such as e-cigarettes. If you need help quitting, ask your provider.  Avoid things that make you cough. These may include perfumes, candles, cleaning products, or campfire smoke.  General instructions    A person holding a cloth over the mouth and nose while coughing.  Watch for any changes to your cough. Tell your provider about them.  Always cover your mouth when you cough.  If the air is dry in your bedroom or home, use a cool mist vaporizer or humidifier.  If your cough is worse at night, try to sleep in a semi-upright position.  Rest as needed.  Contact a health care provider if:  You have new symptoms, or your symptoms get worse.  You cough up pus.  You have a fever that does not go away or a cough that does not get better after 2–3 weeks.  You cannot control your cough with medicine, and you are losing sleep.  You have pain that gets worse or is not helped with medicine.  You lose weight for no clear reason.  You have night sweats.  Get help right away if:  You cough up blood.  You have trouble breathing.  Your heart is beating very fast.  These symptoms may be an emergency. Get help right away. Call 911.  Do not wait to see if the symptoms will go away.  Do not drive yourself to the hospital.  This information is not intended to replace advice given to you by your health care provider. Make sure you discuss any questions you have with your health care provider.    Document Revised: 08/18/2023 Document Reviewed: 08/18/2023  [employer] Patient Education © 2024 [employer]  [employer] logo  Terms and Conditions  Privacy Policy  Editorial Policy  All content on this site: Copyright © 2025 [employer], its licensors, and contributors. All rights are reserved, including those for text and data mining, AI training, and similar technologies. For all open access content, the Creative Commons licensing terms apply.  Cookies are used by this site. To decline or learn more, visit our Cookies page.

## 2025-01-21 NOTE — ED STATDOCS - TEMPLATE, MLM
Preventive Care Visit  [facility]  KENDAL Medrano CNP, OB/Gyn  Mar 8, 2024       SUBJECTIVE:   Kallie is a 23 year old, presenting for the following:  Physical      Healthy Habits:     Getting at least 3 servings of Calcium per day:  Yes    Bi-annual eye exam:  NO    Dental care twice a year:  Yes    Sleep apnea or symptoms of sleep apnea:  None    Diet:  Regular (no restrictions)    Frequency of exercise:  None    Duration of exercise:  Other (Walks all day at work)    Taking medications regularly:  Yes    Barriers to taking medications:  None    Medication side effects:  Other (Period comes at random times)    Recently started Lamictal-her provider told  her to inquire about interaction with Micronor and also to discuss hormone testing to see if her hormones are contributing to her bipolar disorder.    Social History     Tobacco Use    Smoking status: Never    Smokeless tobacco: Never   Substance Use Topics    Alcohol use: No         3/20/2023    10:54 PM   Alcohol Use   Prescreen: >3 drinks/day or >7 drinks/week? Not Applicable     Reviewed orders with patient.  Reviewed health maintenance and updated orders accordingly - Yes  Patient Active Problem List   Diagnosis    Behavior problems    Dyslexia    Helicobacter pylori (H. pylori) infection    Episode of recurrent major depressive disorder (H24)    History of multiple concussions    Chronic daily headache    Positive TURNER (antinuclear antibody)    Severe episode of recurrent major depressive disorder, without psychotic features (H)     Past Surgical History:   Procedure Laterality Date    no surgical history         Social History     Tobacco Use    Smoking status: Never    Smokeless tobacco: Never   Substance Use Topics    Alcohol use: No     Family History   Problem Relation Age of Onset    Lupus Mother     Cancer - colorectal Maternal Grandmother     Cancer Maternal Grandmother     Diabetes Maternal Grandmother     Myocardial  "Infarction Maternal Grandfather     Alzheimer Disease Other         MGGF    Cancer Other         PGGF bladder    Breast Cancer Other         PGGM, great aunts paternal and maternal    Glaucoma No family hx of              History of abnormal Pap smear: NO - age 21-29 PAP every 3 years recommended     Reviewed and updated as needed this visit by clinical staff   Tobacco  Allergies    Med Hx  Surg Hx  Fam Hx  Soc Hx        Reviewed and updated as needed this visit by Provider                  Past Medical History:   Diagnosis Date    Current mild episode of major depressive disorder without prior episode (H24) 2/17/2020    Dyslexia 6/12/2014      Past Surgical History:   Procedure Laterality Date    no surgical history       Review of Systems    Review of Systems  Constitutional, neuro, ENT, endocrine, pulmonary, cardiac, gastrointestinal, genitourinary, musculoskeletal, integument and psychiatric systems are negative, except as otherwise noted.    OBJECTIVE:   /78 (BP Location: Right arm, Cuff Size: Adult Regular)   Ht 1.76 m (5' 9.29\")   Wt 72.8 kg (160 lb 9.6 oz)   LMP 03/03/2024   BMI 23.52 kg/m     Estimated body mass index is 23.52 kg/m  as calculated from the following:    Height as of this encounter: 1.76 m (5' 9.29\").    Weight as of this encounter: 72.8 kg (160 lb 9.6 oz).  Physical Exam  GENERAL: alert and no distress  NECK: no adenopathy, no asymmetry, masses, or scars  RESP: lungs clear to auscultation - no rales, rhonchi or wheezes  CV: regular rate and rhythm, normal S1 S2, no S3 or S4, no murmur, click or rub, no peripheral edema  ABDOMEN: soft, nontender, no hepatosplenomegaly, no masses and bowel sounds normal   (female) w/bimanual: normal female external genitalia, normal urethral meatus, normal vaginal mucosa, and normal cervix/adnexa/uterus without masses or discharge  MS: no gross musculoskeletal defects noted, no edema  SKIN: no suspicious lesions or rashes  NEURO: Normal " strength and tone, mentation intact and speech normal  PSYCH: mentation appears normal, affect normal/bright      ASSESSMENT/PLAN:   Encounter for gynecological examination without abnormal finding  - Pap Screen only - recommended age 21 - 24 years    Encounter for other contraceptive management  Discussed alternate contraception that is progesterone only due to the irregular bleeding, may consider IUD. We discussed insertion, removal, possible side effects, menstrual changes. Reviewed risk of uterine perforation with insertion and discussed possible need for surgical removal. Patient advised to take 600 mg Ibuprofen prior to insertion. For now, desires refill on Micronor.  - norethindrone (MICRONOR) 0.35 MG tablet; Take 1 tablet (0.35 mg) by mouth daily    Genital herpes simplex, unspecified site  - valACYclovir (VALTREX) 500 MG tablet; Take 1 tablet (500 mg) by mouth 2 times daily for 3 days    Screening for STDs (sexually transmitted diseases)  - Chlamydia trachomatis PCR  - Neisseria gonorrhoeae PCR    Severe episode of recurrent major depressive disorder, without psychotic features (H)  Follows with psychiatrist. Reviewed Lamictal can interact with estrogen derivatives and can change serum concentration of Lamictal, did not find data related to concern with progesterone.  Discussed limitations of hormonal testing to see if that is contributing to her moods and reviewed what these types of tests can and cannot demonstrate.      Counseling  Reviewed preventive health counseling, as reflected in patient instructions  Special attention given to:        Regular exercise       Healthy diet/nutrition       Contraception       Safe sex practices/STD prevention        She reports that she has never smoked. She has never used smokeless tobacco.      Depression Screening Follow Up        12/26/2023     9:45 AM   PHQ   PHQ-9 Total Score 12   Q9: Thoughts of better off dead/self-harm past 2 weeks Not at all       Follow Up  Actions Taken  Crisis resource information provided in After Visit Summary  Referred patient back to current mental health provider.              Signed Electronically by: KENDAL Medrano CNP   General